# Patient Record
Sex: MALE | Race: OTHER | Employment: FULL TIME | URBAN - METROPOLITAN AREA
[De-identification: names, ages, dates, MRNs, and addresses within clinical notes are randomized per-mention and may not be internally consistent; named-entity substitution may affect disease eponyms.]

---

## 2017-12-05 DIAGNOSIS — E78.00 PURE HYPERCHOLESTEROLEMIA: ICD-10-CM

## 2017-12-06 RX ORDER — SIMVASTATIN 10 MG/1
TABLET, FILM COATED ORAL
Qty: 30 TAB | Refills: 0 | Status: SHIPPED | OUTPATIENT
Start: 2017-12-06 | End: 2018-07-13

## 2017-12-06 NOTE — TELEPHONE ENCOUNTER
Please call patient. Mail order refill refused. 30 days sent to local pharmacy. Has not been seen in 1 yr. Never had labs done last year, so last labs ~2 yrs old.   Needs appt & labs prior to any further refills

## 2017-12-07 NOTE — TELEPHONE ENCOUNTER
Called pt and informed him he is due for an appt and labs. Left pt a message stating he is due for an appt and labs.

## 2018-05-06 DIAGNOSIS — E78.00 PURE HYPERCHOLESTEROLEMIA: ICD-10-CM

## 2018-05-06 RX ORDER — SIMVASTATIN 10 MG/1
TABLET, FILM COATED ORAL
Qty: 30 TAB | Refills: 0 | OUTPATIENT
Start: 2018-05-06

## 2018-05-10 NOTE — TELEPHONE ENCOUNTER
Attempted to reach patient via phone, unsucessful. Left message on personal cell to return call, overdue for f/u and labs. Med not approved, needs to schedule an appointment.

## 2018-05-22 ENCOUNTER — OFFICE VISIT (OUTPATIENT)
Dept: SLEEP MEDICINE | Age: 38
End: 2018-05-22

## 2018-05-22 VITALS
HEART RATE: 94 BPM | BODY MASS INDEX: 30.03 KG/M2 | SYSTOLIC BLOOD PRESSURE: 143 MMHG | DIASTOLIC BLOOD PRESSURE: 83 MMHG | WEIGHT: 214.5 LBS | OXYGEN SATURATION: 97 % | HEIGHT: 71 IN

## 2018-05-22 DIAGNOSIS — Z86.59 H/O ANXIETY DISORDER: ICD-10-CM

## 2018-05-22 DIAGNOSIS — Z88.9 H/O SEASONAL ALLERGIES: ICD-10-CM

## 2018-05-22 DIAGNOSIS — G47.33 OSA (OBSTRUCTIVE SLEEP APNEA): Primary | ICD-10-CM

## 2018-05-22 RX ORDER — CETIRIZINE HCL 10 MG
TABLET ORAL
Refills: 3 | COMMUNITY
Start: 2018-04-09

## 2018-05-22 RX ORDER — FAMOTIDINE 20 MG/1
TABLET, FILM COATED ORAL
COMMUNITY
Start: 2018-02-20 | End: 2019-09-24

## 2018-05-22 NOTE — PATIENT INSTRUCTIONS
[address] [facility] [place]. [place], [address]  Tel.  [phone]  Fax. [address] [facility] [address]  [place], [address] [facility]  Tel.  [phone]  Fax. [phone] [address]  Tel.  [phone]  Fax. [phone]     Sleep Apnea: After Your Visit  Your Care Instructions  Sleep apnea occurs when you frequently stop breathing for 10 seconds or longer during sleep. It can be mild to severe, based on the number of times per hour that you stop breathing or have slowed breathing. Blocked or narrowed airways in your nose, mouth, or throat can cause sleep apnea. Your airway can become blocked when your throat muscles and tongue relax during sleep. Sleep apnea is common, occurring in 1 out of 20 individuals. Individuals having any of the following characteristics should be evaluated and treated right away due to high risk and detrimental consequences from untreated sleep apnea:  1. Obesity  2. Congestive Heart failure  3. Atrial Fibrillation  4. Uncontrolled Hypertension  5. Type II Diabetes  6. Night-time Arrhythmias  7. Stroke  8. Pulmonary Hypertension  9. High-risk Driving Populations (pilots, truck drivers, etc.)  10. Patients Considering Weight-loss Surgery    How do you know you have sleep apnea? You probably have sleep apnea if you answer 'yes' to 3 or more of the following questions:  S - Have you been told that you Snore? T - Are you often Tired during the day? O - Has anyone Observed you stop breathing while sleeping? P- Do you have (or are being treated for) high blood Pressure? B - Are you obese (Body Mass Index > 35)? A - Is your Age 48years old or older? N - Is your Neck size greater than 16 inches? G - Are you male Gender? A sleep physician can prescribe a breathing device that prevents tissues in the throat from blocking your airway.  Or your doctor may recommend using a dental device (oral breathing device) to help keep your airway open. In some cases, surgery may be needed to remove enlarged tissues in the throat. Follow-up care is a key part of your treatment and safety. Be sure to make and go to all appointments, and call your doctor if you are having problems. It's also a good idea to know your test results and keep a list of the medicines you take. How can you care for yourself at home? · Lose weight, if needed. It may reduce the number of times you stop breathing or have slowed breathing. · Go to bed at the same time every night. · Sleep on your side. It may stop mild apnea. If you tend to roll onto your back, sew a pocket in the back of your pajama top. Put a tennis ball into the pocket, and stitch the pocket shut. This will help keep you from sleeping on your back. · Avoid alcohol and medicines such as sleeping pills and sedatives before bed. · Do not smoke. Smoking can make sleep apnea worse. If you need help quitting, talk to your doctor about stop-smoking programs and medicines. These can increase your chances of quitting for good. · Prop up the head of your bed 4 to 6 inches by putting bricks under the legs of the bed. · Treat breathing problems, such as a stuffy nose, caused by a cold or allergies. · Use a continuous positive airway pressure (CPAP) breathing machine if lifestyle changes do not help your apnea and your doctor recommends it. The machine keeps your airway from closing when you sleep. · If CPAP does not help you, ask your doctor whether you should try other breathing machines. A bilevel positive airway pressure machine has two types of air pressureâone for breathing in and one for breathing out. Another device raises or lowers air pressure as needed while you breathe. · If your nose feels dry or bleeds when using one of these machines, talk with your doctor about increasing moisture in the air. A humidifier may help.   · If your nose is runny or stuffy from using a breathing machine, talk with your doctor about using decongestants or a corticosteroid nasal spray. When should you call for help? Watch closely for changes in your health, and be sure to contact your doctor if:  · You still have sleep apnea even though you have made lifestyle changes. · You are thinking of trying a device such as CPAP. · You are having problems using a CPAP or similar machine. Where can you learn more? Go to [employer] Enter H865 in the search box to learn more about \"Sleep Apnea: After Your Visit. \"   © 7535-5257 [employer]wise, [employer]. Care instructions adapted under license by Rachell Mallory (which disclaims liability or warranty for this information). This care instruction is for use with your licensed healthcare professional. If you have questions about a medical condition or this instruction, always ask your healthcare professional. Katherine Estradaen any warranty or liability for your use of this information. PROPER SLEEP HYGIENE    What to avoid  · Do not have drinks with caffeine, such as coffee or black tea, for 8 hours before bed. · Do not smoke or use other types of tobacco near bedtime. Nicotine is a stimulant and can keep you awake. · Avoid drinking alcohol late in the evening, because it can cause you to wake in the middle of the night. · Do not eat a big meal close to bedtime. If you are hungry, eat a light snack. · Do not drink a lot of water close to bedtime, because the need to urinate may wake you up during the night. · Do not read or watch TV in bed. Use the bed only for sleeping and sexual activity. What to try  · Go to bed at the same time every night, and wake up at the same time every morning. Do not take naps during the day. · Keep your bedroom quiet, dark, and cool. · Get regular exercise, but not within 3 to 4 hours of your bedtime. .  · Sleep on a comfortable pillow and mattress.   · If watching the clock makes you anxious, turn it facing away from you so you cannot see the time. · If you worry when you lie down, start a worry book. Well before bedtime, write down your worries, and then set the book and your concerns aside. · Try meditation or other relaxation techniques before you go to bed. · If you cannot fall asleep, get up and go to another room until you feel sleepy. Do something relaxing. Repeat your bedtime routine before you go to bed again. · Make your house quiet and calm about an hour before bedtime. Turn down the lights, turn off the TV, log off the computer, and turn down the volume on music. This can help you relax after a busy day. Drowsy Driving  The [address] [place] [address] Road Traffic Safety Administration cites drowsiness as a causing factor in more than 586,292 police reported crashes annually, resulting in 76,000 injuries and 1,500 deaths. Other surveys suggest 55% of people polled have driven while drowsy in the past year, 23% had fallen asleep but not crashed, 3% crashed, and 2% had and accident due to drowsy driving. Who is at risk? Young Drivers: One study of drowsy driving accidents states that 55% of the drivers were under 25 years. Of those, 75% were male. Shift Workers and Travelers: People who work overnight or travel across time zones frequently are at higher risk of experiencing Circadian Rhythm Disorders. They are trying to work and function when their body is programed to sleep. Sleep Deprived: Lack of sleep has a serious impact on your ability to pay attention or focus on a task. Consistently getting less than the average of 8 hours your body needs creates partial or cumulative sleep deprivation. Untreated Sleep Disorders: Sleep Apnea, Narcolepsy, R.L.S., and other sleep disorders (untreated) prevent a person from getting enough restful sleep. This leads to excessive daytime sleepiness and increases the risk for drowsy driving accidents by up to 7 times.   Medications / Alcohol: Even over the counter medications can cause drowsiness. Medications that impair a drivers attention should have a warning label. Alcohol naturally makes you sleepy and on its own can cause accidents. Combined with excessive drowsiness its effects are amplified. Signs of Drowsy Driving:   * You don't remember driving the last few miles   * You may drift out of your shannan   * You are unable to focus and your thoughts wander   * You may yawn more often than normal   * You have difficulty keeping your eyes open / nodding off   * Missing traffic signs, speeding, or tailgating  Prevention-   Good sleep hygiene, lifestyle and behavioral choices have the most impact on drowsy driving. There is no substitute for sleep and the average person requires 8 hours nightly. If you find yourself driving drowsy, stop and sleep. Consider the sleep hygiene tips provided during your visit as well. Medication Refill Policy: Refills for all medications require 1 week advance notice. Please have your pharmacy fax a refill request. We are unable to fax, or call in \"controled substance\" medications and you will need to pick these prescriptions up from our office. [employer] Activation    Thank you for requesting access to [employer]. Please follow the instructions below to securely access and download your online medical record. [employer] allows you to send messages to your doctor, view your test results, renew your prescriptions, schedule appointments, and more. How Do I Sign Up? 1. In your internet browser, go to [email or web address]. 2. Click on the First Time User? Click Here link in the Sign In box. You will see the New Member Sign Up page. 3. Enter your [employer] Access Code exactly as it appears below. You will not need to use this code after youve completed the sign-up process. If you do not sign up before the expiration date, you must request a new code. [employer] Access Code:  Activation code not generated  Current [employer] Status: Active (This is the date your [employer] access code will )    4. Enter the last four digits of your Social Security Number (xxxx) and Date of Birth (mm/dd/yyyy) as indicated and click Submit. You will be taken to the next sign-up page. 5. Create a [employer] ID. This will be your [employer] login ID and cannot be changed, so think of one that is secure and easy to remember. 6. Create a [employer] password. You can change your password at any time. 7. Enter your Password Reset Question and Answer. This can be used at a later time if you forget your password. 8. Enter your e-mail address. You will receive e-mail notification when new information is available in [address] 9. Click Sign Up. You can now view and download portions of your medical record. 10. Click the Download Summary menu link to download a portable copy of your medical information. Additional Information    If you have questions, please call [phone]. Remember, [employer] is NOT to be used for urgent needs. For medical emergencies, dial 911.

## 2018-05-22 NOTE — MR AVS SNAPSHOT
[address] [address] [address] [address] 
[phone] Patient: Damon Batres MRN: [id number] DTA:6/90/4031 Visit Information Date & Time Provider Department Dept. Phone Encounter #  
 5/22/2018  3:00 PM Edil Aguirre MD [address] [address] [id number] Follow-up Instructions Return if symptoms worsen or fail to improve. Follow-up and Disposition History Upcoming Health Maintenance Date Due Influenza Age 5 to Adult 8/1/2018 DTaP/Tdap/Td series (2 - Td) 11/1/2026 Allergies as of 5/22/2018  Review Complete On: 5/22/2018 By: Edil Aguirre MD  
 No Known Allergies Current Immunizations  Reviewed on 11/1/2016 Name Date Influenza Vaccine (Quad) PF 11/1/2016 Tdap 11/1/2016 Not reviewed this visit You Were Diagnosed With   
  
 Codes Comments YUNG (obstructive sleep apnea)    -  Primary ICD-10-CM: G47.33 
ICD-9-CM: 327.23   
 H/O seasonal allergies     ICD-10-CM: Z88.9 ICD-9-CM: V15.09   
 H/O anxiety disorder     ICD-10-CM: Z86.59 
ICD-9-CM: V11.8 BMI 29.0-29.9,adult     ICD-10-CM: U67.65 ICD-9-CM: V85.25 Vitals BP Pulse Height(growth percentile) Weight(growth percentile) SpO2 BMI  
 143/83 94 5' 11.45\" (1.815 m) 214 lb 8 oz (97.3 kg) 97% 29.54 kg/m2 Smoking Status Never Smoker Vitals History BMI and BSA Data Body Mass Index Body Surface Area  
 29.54 kg/m 2 2.21 m 2 Preferred Pharmacy Pharmacy Name Phone [id number] [place] [phone] [facility] [phone] Your Updated Medication List  
  
   
This list is accurate as of 5/22/18  4:15 PM.  Always use your most recent med list.  
  
  
  
  
 cetirizine 10 mg tablet Commonly known as:  ZYRTEC  
TAKE 1 TABLET BY MOUTH AT BEDTIME CLARITIN 10 mg tablet Generic drug:  loratadine Take 10 mg by mouth daily. famotidine 20 mg tablet Commonly known as:  PEPCID  
  
 NASACORT AQ 55 mcg nasal inhaler Generic drug:  triamcinolone 2 Sprays daily. simvastatin 10 mg tablet Commonly known as:  ZOCOR  
TAKE 1 TABLET BY MOUTH  NIGHTLY We Performed the Following SLEEP STUDY UNATTENDED, 4 CHANNEL [id number] CPT(R)] Follow-up Instructions Return if symptoms worsen or fail to improve. Patient Instructions 217 [facility]., Nick. 1668 [facility], [address] Tel.  [phone] Fax. 100 [facility] [address] [place], 200 [address] [facility] Tel.  [phone] Fax. [phone] 3300 [facility] [address] [place] [address] Tel.  [phone] Fax. [phone] Sleep Apnea: After Your Visit Your Care Instructions Sleep apnea occurs when you frequently stop breathing for 10 seconds or longer during sleep. It can be mild to severe, based on the number of times per hour that you stop breathing or have slowed breathing. Blocked or narrowed airways in your nose, mouth, or throat can cause sleep apnea. Your airway can become blocked when your throat muscles and tongue relax during sleep. Sleep apnea is common, occurring in 1 out of 20 individuals. Individuals having any of the following characteristics should be evaluated and treated right away due to high risk and detrimental consequences from untreated sleep apnea: 
1. Obesity 2. Congestive Heart failure 3. Atrial Fibrillation 4. Uncontrolled Hypertension 5. Type II Diabetes 6. Night-time Arrhythmias 7. Stroke 8. Pulmonary Hypertension 9. High-risk Driving Populations (pilots, truck drivers, etc.) 10. Patients Considering Weight-loss Surgery How do you know you have sleep apnea? You probably have sleep apnea if you answer 'yes' to 3 or more of the following questions: S - Have you been told that you Snore? T - Are you often Tired during the day? O - Has anyone Observed you stop breathing while sleeping? P- Do you have (or are being treated for) high blood Pressure? B - Are you obese (Body Mass Index > 35)? A - Is your Age 48years old or older? N - Is your Neck size greater than 16 inches? G - Are you male Gender? A sleep physician can prescribe a breathing device that prevents tissues in the throat from blocking your airway. Or your doctor may recommend using a dental device (oral breathing device) to help keep your airway open. In some cases, surgery may be needed to remove enlarged tissues in the throat. Follow-up care is a key part of your treatment and safety. Be sure to make and go to all appointments, and call your doctor if you are having problems. It's also a good idea to know your test results and keep a list of the medicines you take. How can you care for yourself at home? · Lose weight, if needed. It may reduce the number of times you stop breathing or have slowed breathing. · Go to bed at the same time every night. · Sleep on your side. It may stop mild apnea. If you tend to roll onto your back, sew a pocket in the back of your paProtÃ©gÃ© Biomedical top. Put a tennis ball into the pocket, and stitch the pocket shut. This will help keep you from sleeping on your back. · Avoid alcohol and medicines such as sleeping pills and sedatives before bed. · Do not smoke. Smoking can make sleep apnea worse. If you need help quitting, talk to your doctor about stop-smoking programs and medicines. These can increase your chances of quitting for good. · Prop up the head of your bed 4 to 6 inches by putting bricks under the legs of the bed. · Treat breathing problems, such as a stuffy nose, caused by a cold or allergies. · Use a continuous positive airway pressure (CPAP) breathing machine if lifestyle changes do not help your apnea and your doctor recommends it. The machine keeps your airway from closing when you sleep. · If CPAP does not help you, ask your doctor whether you should try other breathing machines. A bilevel positive airway pressure machine has two types of air pressureâone for breathing in and one for breathing out. Another device raises or lowers air pressure as needed while you breathe. · If your nose feels dry or bleeds when using one of these machines, talk with your doctor about increasing moisture in the air. A humidifier may help. · If your nose is runny or stuffy from using a breathing machine, talk with your doctor about using decongestants or a corticosteroid nasal spray. When should you call for help? Watch closely for changes in your health, and be sure to contact your doctor if: 
· You still have sleep apnea even though you have made lifestyle changes. · You are thinking of trying a device such as CPAP. · You are having problems using a CPAP or similar machine. Where can you learn more? Go to [employer]. Enter H760 in the search box to learn more about \"Sleep Apnea: After Your Visit. \"  
© 0609-2250 Healthwise, Incorporated. Care instructions adapted under license by [facility] (which disclaims liability or warranty for this information). This care instruction is for use with your licensed healthcare professional. If you have questions about a medical condition or this instruction, always ask your healthcare professional. Aman Whitter any warranty or liability for your use of this information. PROPER SLEEP HYGIENE What to avoid · Do not have drinks with caffeine, such as coffee or black tea, for 8 hours before bed. · Do not smoke or use other types of tobacco near bedtime. Nicotine is a stimulant and can keep you awake. · Avoid drinking alcohol late in the evening, because it can cause you to wake in the middle of the night. · Do not eat a big meal close to bedtime. If you are hungry, eat a light snack. · Do not drink a lot of water close to bedtime, because the need to urinate may wake you up during the night. · Do not read or watch TV in bed. Use the bed only for sleeping and sexual activity. What to try · Go to bed at the same time every night, and wake up at the same time every morning. Do not take naps during the day. · Keep your bedroom quiet, dark, and cool. · Get regular exercise, but not within 3 to 4 hours of your bedtime. [place] · Sleep on a comfortable pillow and mattress. · If watching the clock makes you anxious, turn it facing away from you so you cannot see the time. · If you worry when you lie down, start a worry book. Well before bedtime, write down your worries, and then set the book and your concerns aside. · Try meditation or other relaxation techniques before you go to bed. · If you cannot fall asleep, get up and go to another room until you feel sleepy. Do something relaxing. Repeat your bedtime routine before you go to bed again. · Make your house quiet and calm about an hour before bedtime. Turn down the lights, turn off the TV, log off the computer, and turn down the volume on music. This can help you relax after a busy day. Drowsy Driving The [place] [address] cites drowsiness as a causing factor in more than 198,271 police reported crashes annually, resulting in 76,000 injuries and 1,500 deaths. Other surveys suggest 55% of people polled have driven while drowsy in the past year, 23% had fallen asleep but not crashed, 3% crashed, and 2% had and accident due to drowsy driving. Who is at risk? Young Drivers: One study of drowsy driving accidents states that 55% of the drivers were under 25 years. Of those, 75% were male. Shift Workers and Travelers: People who work overnight or travel across time zones frequently are at higher risk of experiencing Circadian Rhythm Disorders.  They are trying to work and function when their body is programed to sleep. Sleep Deprived: Lack of sleep has a serious impact on your ability to pay attention or focus on a task. Consistently getting less than the average of 8 hours your body needs creates partial or cumulative sleep deprivation. Untreated Sleep Disorders: Sleep Apnea, Narcolepsy, R.L.S., and other sleep disorders (untreated) prevent a person from getting enough restful sleep. This leads to excessive daytime sleepiness and increases the risk for drowsy driving accidents by up to 7 times. Medications / Alcohol: Even over the counter medications can cause drowsiness. Medications that impair a drivers attention should have a warning label. Alcohol naturally makes you sleepy and on its own can cause accidents. Combined with excessive drowsiness its effects are amplified. Signs of Drowsy Driving: * You don't remember driving the last few miles * You may drift out of your shannan * You are unable to focus and your thoughts wander * You may yawn more often than normal 
 * You have difficulty keeping your eyes open / nodding off * Missing traffic signs, speeding, or tailgating Prevention-  
Good sleep hygiene, lifestyle and behavioral choices have the most impact on drowsy driving. There is no substitute for sleep and the average person requires 8 hours nightly. If you find yourself driving drowsy, stop and sleep. Consider the sleep hygiene tips provided during your visit as well. Medication Refill Policy: Refills for all medications require 1 week advance notice. Please have your pharmacy fax a refill request. We are unable to fax, or call in \"controled substance\" medications and you will need to pick these prescriptions up from our office. XL Video Activation Thank you for requesting access to XL Video. Please follow the instructions below to securely access and download your online medical record.  XL Video allows you to send messages to your doctor, view your test results, renew your prescriptions, schedule appointments, and more. How Do I Sign Up? 1. In your internet browser, go to [email or web address] 2. Click on the First Time User? Click Here link in the Sign In box. You will see the New Member Sign Up page. 3. Enter your Food Geniust Access Code exactly as it appears below. You will not need to use this code after youve completed the sign-up process. If you do not sign up before the expiration date, you must request a new code. MyChart Access Code: Activation code not generated Current [email or web address] Application Status: Active (This is the date your Food Geniust access code will ) 4. Enter the last four digits of your Social Security Number (xxxx) and Date of Birth (mm/dd/yyyy) as indicated and click Submit. You will be taken to the next sign-up page. 5. Create a [email or web address] Application ID. This will be your [email or web address] Application login ID and cannot be changed, so think of one that is secure and easy to remember. 6. Create a [email or web address] Application password. You can change your password at any time. 7. Enter your Password Reset Question and Answer. This can be used at a later time if you forget your password. 8. Enter your e-mail address. You will receive e-mail notification when new information is available in [address] 9. Click Sign Up. You can now view and download portions of your medical record. 10. Click the Download Summary menu link to download a portable copy of your medical information. Additional Information If you have questions, please call [phone]. Remember, [email or web address] Application is NOT to be used for urgent needs. For medical emergencies, dial 911. Introducing [facility]! Dear Hazel Zhang: Thank you for requesting a [email or web address] Application account. Our records indicate that you already have an active [email or web address] Application account. You can access your account anytime at [email or web address] Did you know that you can access your hospital and ER discharge instructions at any time in Accelerate Mobile Apps? You can also review all of your test results from your hospital stay or ER visit. Additional Information If you have questions, please visit the Frequently Asked Questions section of the Accelerate Mobile Apps website at [email or web address] Remember, [employer] Mobile Apps is NOT to be used for urgent needs. For medical emergencies, dial 911. Now available from your iPhone and Android! Please provide this summary of care documentation to your next provider. Your primary care clinician is listed as Lila Martines. If you have any questions after today's visit, please call [phone].

## 2018-05-22 NOTE — PROGRESS NOTES
217 [facility]., [place]. [place], [address]  Tel.  [phone]  Fax. 100 [facility] 60  [place], 200 [address] [facility]  Tel.  [phone]  Fax. [phone] [address] [place] [address] [place]   Tel.  [phone]  Fax. [phone]         Subjective:      Radha Carpio is an 41 y.o. male referred for evaluation for a sleep disorder. He complains of snoring associated with snorting and daytime tiredness. Symptoms began 5 years ago, gradually worsening since that time. He usually can fall asleep in 5 - 10 minutes. Family or house members note snoring, snorting. He denies completely or partially paralyzed while falling asleep or waking up. Radha Carpio does wake up frequently at night. He is bothered by waking up too early and left unable to get back to sleep. He actually sleeps about 8 hours at night and wakes up about 3 times during the night. He does not work shifts: Jeremi Marie indicates he does not get too little sleep at night. His bedtime is 0000. He awakens at 0600. He does take naps. He takes 1 naps a week lasting 30 to 45, Minute(s). He has the following observed behaviors: Loud snoring, Light snoring; He denies of an urge to move extremities due to discomfort or other sensation and denies of dream enactment behavior. Other remarks: Waking with a gasp or snort    Berlin Sleepiness Score: 11 which reflect moderate daytime drowsiness. No Known Allergies      Current Outpatient Prescriptions:     cetirizine (ZYRTEC) 10 mg tablet, TAKE 1 TABLET BY MOUTH AT BEDTIME, Disp: , Rfl: 3    famotidine (PEPCID) 20 mg tablet, , Disp: , Rfl:     simvastatin (ZOCOR) 10 mg tablet, TAKE 1 TABLET BY MOUTH  NIGHTLY, Disp: 30 Tab, Rfl: 0    triamcinolone (NASACORT AQ) 55 mcg nasal inhaler, 2 Sprays daily. , Disp: , Rfl:     loratadine (CLARITIN) 10 mg tablet, Take 10 mg by mouth daily. , Disp: , Rfl:      He  has a past medical history of Anxiety; GERD (gastroesophageal reflux disease); Hyperlipidemia; and Lumbar back pain. He  has a past surgical history that includes hx endoscopy (1/29/16). He family history includes Cancer in his mother; Heart Disease (age of onset: 68) in his father; Hypertension in his father; No Known Problems in his brother, sister, sister, son, and son. He  reports that he has never smoked. He has never used smokeless tobacco. He reports that he drinks about 0.6 oz of alcohol per week  He reports that he does not use illicit drugs. Review of Systems:  Constitutional:  significant weight gain -  10 lbs in past 2 years  Eyes:  No blurred vision  CVS:  No significant chest pain  Pulm:  No significant shortness of breath  GI:  No significant nausea or vomiting  :  No significant nocturia  Musculoskeletal:  No significant joint pain at night  Skin:  No significant rashes  Neuro:  No significant dizziness   Psych:  No active mood issues    Sleep Review of Systems: notable for no difficulty falling asleep; infrequent awakenings at night;  regular dreaming noted; no nightmares ; no early morning headaches; no memory problems; no concentration issues; no history of any automobile or occupational accidents due to daytime drowsiness. Objective:     Visit Vitals    /83    Pulse 94    Ht 5' 11.45\" (1.815 m)    Wt 214 lb 8 oz (97.3 kg)    SpO2 97%    BMI 29.54 kg/m2         General:   Not in acute distress   Eyes:  Anicteric sclerae, no obvious strabismus   Nose:  No obvious nasal septum deviation    Oropharynx:   Class 2 oropharyngeal outlet, thick tongue base, uvula could not be seen due to low-lying soft palate, narrow tonsilo-pharyngeal pilars   Tonsils:   tonsils are not seen due to low-lying soft palate   Neck:   Neck circ. in \"inches\": 15; midline trachea   Chest/Lungs:  Equal lung expansion, clear on auscultation    CVS:  Normal rate, regular rhythm; no JVD   Skin:  Warm to touch; no obvious rashes   Neuro:   No focal deficits ; no obvious tremor    Psych:  Normal affect,  normal countenance;          Assessment:       ICD-10-CM ICD-9-CM    1. YUNG (obstructive sleep apnea) G47.33 327.23 SLEEP STUDY UNATTENDED, 4 CHANNEL   2. H/O seasonal allergies Z88.9 V15.09    3. H/O anxiety disorder Z86.59 V11.8    4. BMI 29.0-29.9,adult Z68.29 V85.25          Plan:     * The patient currently has a Low Risk for having sleep apnea. STOP-BANG score 3.  * Sleep testing was ordered for initial evaluation. * He was provided information on sleep apnea including coresponding risk factors and the importance of proper treatment. * Treatment options if indicated were reviewed today. Patient agrees to a trial of PAP therapy / upper airway evaluation (Dr. Karrie Bhatti) if indicated. * Counseling was provided regarding proper sleep hygiene (including effect of light on sleep), paradoxical intention, stimulus control, sleep environment safety and safe driving. * Effect of sleep disturbance on weight was reviewed. We have recommended a dedicated weight loss through appropriate diet and an exercise regiment as significant weight reduction has been shown to reduce severity of obstructive sleep apnea. * Patient agrees to telephone [phone]  follow-up by myself or lead sleep technologist shortly after sleep study to review results and plan final management.     (patient has given permission for a message to be left regarding test results and further management if patient cannot be cannot be reached directly). Thank you for allowing us to participate in your patient's medical care. We'll keep you updated on these investigations. Anneliese Stover MD, FAASM  Electronically signed.  05/22/18

## 2018-05-22 NOTE — PROGRESS NOTES
217 [facility]., [address]. [place], [address]  Tel.  [phone]  Fax. 100 [facility] 60  [place], 200 [address] [facility]  Tel.  [phone]  Fax. [phone] 9250 [facility] [address]   Tel.  [phone]  Fax. [phone]       S>Remy Juarez is a 41 y.o. male seen today to receive a home sleep testing unit (HST). · Patient was educated on proper hookup and operation of the HST. · Instruction forms and documentation were reviewed and signed. · The patient demonstrated good understanding of the HST. O>    Visit Vitals    /83    Pulse 94    Ht 5' 11.45\" (1.815 m)    Wt 214 lb 8 oz (97.3 kg)    SpO2 97%    BMI 29.54 kg/m2    Neck circ. in \"inches\": 15    A>  1. YUNG (obstructive sleep apnea)    2. H/O seasonal allergies    3. H/O anxiety disorder    4. BMI 29.0-29.9,adult          P>  · General information regarding operations and maintenance of the device was provided. · He was provided information on sleep apnea including coresponding risk factors and the importance of proper treatment. · Follow-up appointment was made to return the HST. He will be contacted once the results have been reviewed. · He was asked to contact our office for any problems regarding his home sleep test study.

## 2018-05-23 ENCOUNTER — HOSPITAL ENCOUNTER (OUTPATIENT)
Dept: SLEEP MEDICINE | Age: 38
Discharge: HOME | End: 2018-05-23
Payer: COMMERCIAL

## 2018-05-23 ENCOUNTER — DOCUMENTATION ONLY (OUTPATIENT)
Dept: SLEEP MEDICINE | Age: 38
End: 2018-05-23

## 2018-05-23 PROCEDURE — 95806 SLEEP STUDY UNATT&RESP EFFT: CPT | Performed by: INTERNAL MEDICINE

## 2018-05-25 ENCOUNTER — TELEPHONE (OUTPATIENT)
Dept: SLEEP MEDICINE | Age: 38
End: 2018-05-25

## 2018-05-25 NOTE — TELEPHONE ENCOUNTER
[facility]    Date of Study: 5/23/18    The following information was gathered from the patients study log:    · Lights off: 11p  · Estimated sleep onset: 11:30P    · Awakened a total of several times  · The patient felt they slept 7 hours  · Patient took no sleep aid before starting the test  · Sleep quality was worse compared to a usual nights sleep. Further information provided: \"This was a particularly bad night. I was anxious because of an e-mail from work and ate a dark chocolate for dessert that affected my stomach. \"

## 2018-05-29 NOTE — TELEPHONE ENCOUNTER
Kaykay Crum is to be contacted by lead sleep technologist regarding results of Sleep Testing which was indicative of an average AHI of 0.7 per hour with an SpO2 meme of 93% and SpO2 of < 88% being 0 minutes. Patient should return for repeat testing due to poor sleep on night of testing and presence of significant risk factors for Obstructive Sleep Apnea - STOP- BANG 3.

## 2018-05-30 NOTE — TELEPHONE ENCOUNTER
Reviewed results with patient and he verbalized understanding. He is willing to repeat the study but is going out of town for a month and will call us when he returns.

## 2018-07-13 ENCOUNTER — OFFICE VISIT (OUTPATIENT)
Dept: INTERNAL MEDICINE CLINIC | Age: 38
End: 2018-07-13

## 2018-07-13 VITALS
WEIGHT: 215 LBS | DIASTOLIC BLOOD PRESSURE: 72 MMHG | BODY MASS INDEX: 30.1 KG/M2 | TEMPERATURE: 98.1 F | SYSTOLIC BLOOD PRESSURE: 108 MMHG | HEIGHT: 71 IN | OXYGEN SATURATION: 97 % | RESPIRATION RATE: 14 BRPM | HEART RATE: 80 BPM

## 2018-07-13 DIAGNOSIS — E66.3 OVERWEIGHT (BMI 25.0-29.9): ICD-10-CM

## 2018-07-13 DIAGNOSIS — E78.00 PURE HYPERCHOLESTEROLEMIA: Primary | ICD-10-CM

## 2018-07-13 DIAGNOSIS — R09.82 POST-NASAL DRIP: ICD-10-CM

## 2018-07-13 RX ORDER — SIMVASTATIN 10 MG/1
TABLET, FILM COATED ORAL
Qty: 90 TAB | Refills: 0 | Status: CANCELLED | OUTPATIENT
Start: 2018-07-13

## 2018-07-13 NOTE — PROGRESS NOTES
Jose Cunningham is a 41 y.o. male who was seen in clinic today (7/13/2018). Since last visit moved to [place], [place] and has moved back about 1 year ago. Last seen in clinic on 11/1/16. Assessment & Plan:   Diagnoses and all orders for this visit:    1. Pure hypercholesterolemia- chronic issue, unknown control, non-compliant w/ f/u & medications, reviewed diet & medications, will check labs & then restart meds. Will likely need to repeat labs in 3 months  -     METABOLIC PANEL, COMPREHENSIVE  -     LIPID PANEL    2. Overweight (BMI 25.0-29. 9)- poorly controlled, needs improvement, worsening, I have reviewed/discussed the above normal BMI with the patient. I have recommended the following interventions: encourage exercise and lifestyle education regarding diet. 3. Post-nasal drip- stable, defer to specialist, reviewed sleep study, he would like to put off f/u at this time and sounds reasonable while trying to make life style changes         Follow-up Disposition:  Return in about 1 year (around 7/13/2019), or if symptoms worsen or fail to improve, for FULL PHYSICAL - 30 minutes. Subjective:   Lily Gómez was seen today for Cholesterol Problem    Cardiovascular Review  The patient has hyperlipidemia and overweight. Since last visit: weight has increased and has been off medication for 4-6 wks. He reports taking medications as instructed, no medication side effects noted. Diet and Lifestyle: generally follows a low fat low cholesterol diet, sedentary. Labs: no lab studies available for review at time of visit. Allergies  He has Allergic Rhinitis that be consistant throughout the year. since last visit saw ENT and sleep specialist.  Reviewed sleep study. He reports feeling better. Current symptoms: intermittent symptoms. He reports: taking medications as instructed, no medication side effects noted.               Brief Labs:   No results found for: NA, K, CREA, CREATEXT, CHOL, HDL, LDLC, LDL, LDLCEXT, TRIGL, HBA1C, QEI9XCIO, TSH, RFQ3GDHH, TSHEXT       Prior to Admission medications    Medication Sig Start Date End Date Taking? Authorizing Provider   OTHER Ketoconazole shampoo   Yes Historical Provider   OTHER Allergy shots every week   Yes Historical Provider   cetirizine (ZYRTEC) 10 mg tablet TAKE 1 TABLET BY MOUTH AT BEDTIME 4/9/18  Yes Historical Provider   famotidine (PEPCID) 20 mg tablet  2/20/18  Yes Historical Provider   triamcinolone (NASACORT AQ) 55 mcg nasal inhaler 2 Sprays daily. Yes Historical Provider          No Known Allergies        Review of Systems   Respiratory: Negative for cough and shortness of breath. Cardiovascular: Negative for chest pain and palpitations. Gastrointestinal: Negative for abdominal pain, constipation, diarrhea, nausea and vomiting. Objective:   Physical Exam   Cardiovascular: Regular rhythm and normal heart sounds. No murmur heard. Pulmonary/Chest: Effort normal and breath sounds normal. He has no wheezes. He has no rales. Abdominal: Soft. Bowel sounds are normal. He exhibits no mass. There is no hepatosplenomegaly. There is no tenderness. Visit Vitals    /72    Pulse 80    Temp 98.1 °F (36.7 °C) (Oral)    Resp 14    Ht 5' 11.45\" (1.815 m)    Wt 215 lb (97.5 kg)    SpO2 97%    BMI 29.61 kg/m2         Disclaimer:  Advised him to call back or return to office if symptoms worsen/change/persist.  Discussed expected course/resolution/complications of diagnosis in detail with patient. Medication risks/benefits/costs/interactions/alternatives discussed with patient. He was given an after visit summary which includes diagnoses, current medications, & vitals. He expressed understanding with the diagnosis and plan. Aspects of this note may have been generated using voice recognition software. Despite editing, there may be some syntax errors.        Richrd Paget, MD

## 2018-07-19 LAB
ALBUMIN SERPL-MCNC: 4.5 G/DL (ref 3.5–5.5)
ALBUMIN/GLOB SERPL: 1.6 {RATIO} (ref 1.2–2.2)
ALP SERPL-CCNC: 51 IU/L (ref 39–117)
ALT SERPL-CCNC: 28 IU/L (ref 0–44)
AST SERPL-CCNC: 20 IU/L (ref 0–40)
BILIRUB SERPL-MCNC: 0.4 MG/DL (ref 0–1.2)
BUN SERPL-MCNC: 12 MG/DL (ref 6–20)
BUN/CREAT SERPL: 13 (ref 9–20)
CALCIUM SERPL-MCNC: 9.5 MG/DL (ref 8.7–10.2)
CHLORIDE SERPL-SCNC: 101 MMOL/L (ref 96–106)
CHOLEST SERPL-MCNC: 249 MG/DL (ref 100–199)
CO2 SERPL-SCNC: 24 MMOL/L (ref 20–29)
CREAT SERPL-MCNC: 0.94 MG/DL (ref 0.76–1.27)
GLOBULIN SER CALC-MCNC: 2.8 G/DL (ref 1.5–4.5)
GLUCOSE SERPL-MCNC: 98 MG/DL (ref 65–99)
HDLC SERPL-MCNC: 37 MG/DL
LDLC SERPL CALC-MCNC: 182 MG/DL (ref 0–99)
POTASSIUM SERPL-SCNC: 4.7 MMOL/L (ref 3.5–5.2)
PROT SERPL-MCNC: 7.3 G/DL (ref 6–8.5)
SODIUM SERPL-SCNC: 140 MMOL/L (ref 134–144)
TRIGL SERPL-MCNC: 148 MG/DL (ref 0–149)
VLDLC SERPL CALC-MCNC: 30 MG/DL (ref 5–40)

## 2018-07-19 RX ORDER — SIMVASTATIN 10 MG/1
10 TABLET, FILM COATED ORAL
Qty: 90 TAB | Refills: 0 | Status: SHIPPED | OUTPATIENT
Start: 2018-07-19 | End: 2018-11-17 | Stop reason: SDUPTHER

## 2018-07-19 NOTE — PROGRESS NOTES
Results released to patient via [employer]. All labs are stable or at goal for him, except for LDL which is worse off meds. , based on father's history will recommend restarting statin.

## 2018-11-17 DIAGNOSIS — E78.00 PURE HYPERCHOLESTEROLEMIA: Primary | ICD-10-CM

## 2018-11-18 RX ORDER — SIMVASTATIN 10 MG/1
TABLET, FILM COATED ORAL
Qty: 90 TAB | Refills: 0 | Status: SHIPPED | OUTPATIENT
Start: 2018-11-18 | End: 2019-03-03 | Stop reason: SDUPTHER

## 2018-11-19 ENCOUNTER — PATIENT MESSAGE (OUTPATIENT)
Dept: INTERNAL MEDICINE CLINIC | Age: 38
End: 2018-11-19

## 2018-11-19 NOTE — TELEPHONE ENCOUNTER
Attempted to reach patient via phone, unsucessful. Left message to check his mychart and to return call if he had any questions.

## 2018-12-04 NOTE — TELEPHONE ENCOUNTER
Call to patient regarding unread mychart message. Advised due for labs since starting meds, lab slip at the , needs to be fasting. Patient verbalized understanding.

## 2019-03-03 DIAGNOSIS — E78.00 PURE HYPERCHOLESTEROLEMIA: ICD-10-CM

## 2019-03-03 RX ORDER — SIMVASTATIN 10 MG/1
TABLET, FILM COATED ORAL
Qty: 90 TAB | Refills: 0 | Status: SHIPPED | OUTPATIENT
Start: 2019-03-03 | End: 2019-09-01 | Stop reason: SDUPTHER

## 2019-03-03 NOTE — LETTER
3/4/2019 11:22 AM 
 
Mr. [address] [phone] Mr. Huitron, We tried to call you and unfortunately your voicemail was full, wanted you to know we refilled your medications but you are due to have your labs drawn. Enclosed you will find the order for Labs, you can come here to have labs drawn or go to your nearest [address] [place] [address]. No appointment is required. Please call if you have any questions. Take care. Sincerely, Sidra Sloan LPN

## 2019-03-04 NOTE — TELEPHONE ENCOUNTER
Please call patient. meds refilled but needs to get labs repeated, needs to be fasting, labs already ordered.

## 2019-03-04 NOTE — TELEPHONE ENCOUNTER
Attempted to reach patient by phone, unsuccessful. Voicemail box is full. Detailed letter and lab slip mailed to patient.

## 2019-03-14 LAB
ALBUMIN SERPL-MCNC: 4.7 G/DL (ref 3.5–5.5)
ALP SERPL-CCNC: 47 IU/L (ref 39–117)
ALT SERPL-CCNC: 37 IU/L (ref 0–44)
AST SERPL-CCNC: 22 IU/L (ref 0–40)
BILIRUB DIRECT SERPL-MCNC: 0.09 MG/DL (ref 0–0.4)
BILIRUB SERPL-MCNC: 0.4 MG/DL (ref 0–1.2)
CHOLEST SERPL-MCNC: 226 MG/DL (ref 100–199)
HDLC SERPL-MCNC: 38 MG/DL
LDLC SERPL CALC-MCNC: 158 MG/DL (ref 0–99)
PROT SERPL-MCNC: 7.5 G/DL (ref 6–8.5)
TRIGL SERPL-MCNC: 148 MG/DL (ref 0–149)
VLDLC SERPL CALC-MCNC: 30 MG/DL (ref 5–40)

## 2019-03-14 NOTE — TELEPHONE ENCOUNTER
Results released to patient via [employer]. Lipids improved back on medications but not as good as previously.   No changes and will address at f/u as he is on moderate intensity statin

## 2019-09-01 DIAGNOSIS — E78.00 PURE HYPERCHOLESTEROLEMIA: ICD-10-CM

## 2019-09-01 RX ORDER — SIMVASTATIN 10 MG/1
TABLET, FILM COATED ORAL
Qty: 30 TAB | Refills: 0 | Status: SHIPPED | OUTPATIENT
Start: 2019-09-01 | End: 2019-09-25

## 2019-09-24 ENCOUNTER — OFFICE VISIT (OUTPATIENT)
Dept: INTERNAL MEDICINE CLINIC | Age: 39
End: 2019-09-24

## 2019-09-24 VITALS
BODY MASS INDEX: 30.52 KG/M2 | RESPIRATION RATE: 16 BRPM | HEIGHT: 71 IN | TEMPERATURE: 98.5 F | WEIGHT: 218 LBS | SYSTOLIC BLOOD PRESSURE: 110 MMHG | DIASTOLIC BLOOD PRESSURE: 76 MMHG | HEART RATE: 83 BPM | OXYGEN SATURATION: 96 %

## 2019-09-24 DIAGNOSIS — Z23 ENCOUNTER FOR IMMUNIZATION: ICD-10-CM

## 2019-09-24 DIAGNOSIS — E78.00 PURE HYPERCHOLESTEROLEMIA: Primary | ICD-10-CM

## 2019-09-24 DIAGNOSIS — E66.9 OBESITY (BMI 30.0-34.9): ICD-10-CM

## 2019-09-24 NOTE — PROGRESS NOTES
Sarthak Plasencia is a 46 y.o. male who was seen in clinic today (9/24/2019). Assessment & Plan:     Diagnoses and all orders for this visit:    1. Pure hypercholesterolemia- improved on meds, reviewed ideal LDL goals, will repeat, continue on low intensity statin for now, did review increasing to 20mg if LDL remains high  -     METABOLIC PANEL, COMPREHENSIVE  -     CBC W/O DIFF  -     LIPID PANEL    2. Obesity (BMI 30.0-34.9)- poorly controlled, worsening, I have recommended the following interventions: encourage exercise and lifestyle education regarding diet. 3. Encounter for immunization  -     DE IMMUNIZ ADMIN,1 SINGLE/COMB VAC/TOXOID  -     INFLUENZA VIRUS VAC QUAD,SPLIT,PRESV FREE SYRINGE IM         Follow-up and Dispositions    · Return in about 1 year (around 9/24/2020) for Regular follow up. Subjective:   Mena Dance was seen today for Cholesterol Problem    Cardiovascular Review  The patient has hyperlipidemia and obesity. Since last visit: started on simvastatin. Weight has increased. He reports taking medications as instructed, no medication side effects noted. Diet and Lifestyle: generally follows a low fat low cholesterol diet, exercises regularly. Labs: reviewed and discussed with patient. Brief Labs:     Lab Results   Component Value Date/Time    Sodium 140 07/19/2018 12:00 AM    Potassium 4.7 07/19/2018 12:00 AM    Creatinine 0.94 07/19/2018 12:00 AM    Cholesterol, total 226 03/13/2019 09:07 AM    HDL Cholesterol 38 03/13/2019 09:07 AM    LDL, calculated 158 03/13/2019 09:07 AM    Triglyceride 148 03/13/2019 09:07 AM          Prior to Admission medications    Medication Sig Start Date End Date Taking?  Authorizing Provider   simvastatin (ZOCOR) 10 mg tablet TAKE 1 TABLET BY MOUTH EVERY DAY AT NIGHT 9/1/19  Yes Gloria Hunt MD   OTHER Ketoconazole shampoo   Yes Provider, Historical   OTHER Allergy shots every week   Yes Provider, Historical   cetirizine (ZYRTEC) 10 mg tablet TAKE 1 TABLET BY MOUTH AT BEDTIME 4/9/18  Yes Provider, Historical   triamcinolone (NASACORT AQ) 55 mcg nasal inhaler 2 Sprays daily. Yes Provider, Historical          No Known Allergies        Review of Systems   Respiratory: Negative for cough and shortness of breath. Cardiovascular: Negative for chest pain and palpitations. Gastrointestinal: Negative for abdominal pain, constipation, diarrhea, nausea and vomiting. Objective:   Physical Exam   Cardiovascular: Regular rhythm and normal heart sounds. No murmur heard. Pulmonary/Chest: Effort normal and breath sounds normal. He has no wheezes. He has no rales. Abdominal: Soft. Bowel sounds are normal. He exhibits no mass. There is no hepatosplenomegaly. There is no tenderness. Visit Vitals  /76   Pulse 83   Temp 98.5 °F (36.9 °C) (Oral)   Resp 16   Ht 5' 11.45\" (1.815 m)   Wt 218 lb (98.9 kg)   SpO2 96%   BMI 30.02 kg/m²         Disclaimer:  Advised him to call back or return to office if symptoms worsen/change/persist.  Discussed expected course/resolution/complications of diagnosis in detail with patient. Medication risks/benefits/costs/interactions/alternatives discussed with patient. He was given an after visit summary which includes diagnoses, current medications, & vitals. He expressed understanding with the diagnosis and plan. Aspects of this note may have been generated using voice recognition software. Despite editing, there may be some syntax errors.        Liberty Sanford MD

## 2019-09-25 DIAGNOSIS — E78.00 PURE HYPERCHOLESTEROLEMIA: ICD-10-CM

## 2019-09-25 LAB
ALBUMIN SERPL-MCNC: 4.9 G/DL (ref 3.5–5.5)
ALBUMIN/GLOB SERPL: 2 {RATIO} (ref 1.2–2.2)
ALP SERPL-CCNC: 50 IU/L (ref 39–117)
ALT SERPL-CCNC: 42 IU/L (ref 0–44)
AST SERPL-CCNC: 27 IU/L (ref 0–40)
BILIRUB SERPL-MCNC: 0.3 MG/DL (ref 0–1.2)
BUN SERPL-MCNC: 14 MG/DL (ref 6–20)
BUN/CREAT SERPL: 14 (ref 9–20)
CALCIUM SERPL-MCNC: 9.6 MG/DL (ref 8.7–10.2)
CHLORIDE SERPL-SCNC: 100 MMOL/L (ref 96–106)
CHOLEST SERPL-MCNC: 224 MG/DL (ref 100–199)
CO2 SERPL-SCNC: 26 MMOL/L (ref 20–29)
CREAT SERPL-MCNC: 1.02 MG/DL (ref 0.76–1.27)
ERYTHROCYTE [DISTWIDTH] IN BLOOD BY AUTOMATED COUNT: 13.7 % (ref 12.3–15.4)
GLOBULIN SER CALC-MCNC: 2.4 G/DL (ref 1.5–4.5)
GLUCOSE SERPL-MCNC: 82 MG/DL (ref 65–99)
HCT VFR BLD AUTO: 44.7 % (ref 37.5–51)
HDLC SERPL-MCNC: 39 MG/DL
HGB BLD-MCNC: 14.7 G/DL (ref 13–17.7)
LDLC SERPL CALC-MCNC: 139 MG/DL (ref 0–99)
MCH RBC QN AUTO: 27.5 PG (ref 26.6–33)
MCHC RBC AUTO-ENTMCNC: 32.9 G/DL (ref 31.5–35.7)
MCV RBC AUTO: 84 FL (ref 79–97)
PLATELET # BLD AUTO: 221 X10E3/UL (ref 150–450)
POTASSIUM SERPL-SCNC: 4.5 MMOL/L (ref 3.5–5.2)
PROT SERPL-MCNC: 7.3 G/DL (ref 6–8.5)
RBC # BLD AUTO: 5.34 X10E6/UL (ref 4.14–5.8)
SODIUM SERPL-SCNC: 142 MMOL/L (ref 134–144)
TRIGL SERPL-MCNC: 232 MG/DL (ref 0–149)
VLDLC SERPL CALC-MCNC: 46 MG/DL (ref 5–40)
WBC # BLD AUTO: 5.8 X10E3/UL (ref 3.4–10.8)

## 2019-09-25 RX ORDER — SIMVASTATIN 10 MG/1
TABLET, FILM COATED ORAL
Qty: 90 TAB | Refills: 3 | Status: SHIPPED | OUTPATIENT
Start: 2019-09-25 | End: 2020-10-18

## 2019-09-25 NOTE — PROGRESS NOTES
Results released to patient via [employer]. All labs are stable or at goal for him. TG up slightly (non-fasting). LDL improved, HDL improved slightly.

## 2020-06-30 ENCOUNTER — TELEPHONE (OUTPATIENT)
Dept: INTERNAL MEDICINE CLINIC | Age: 40
End: 2020-06-30

## 2020-07-02 ENCOUNTER — OFFICE VISIT (OUTPATIENT)
Dept: INTERNAL MEDICINE CLINIC | Age: 40
End: 2020-07-02

## 2020-07-02 VITALS
BODY MASS INDEX: 31.64 KG/M2 | SYSTOLIC BLOOD PRESSURE: 142 MMHG | TEMPERATURE: 97.8 F | HEIGHT: 71 IN | HEART RATE: 98 BPM | DIASTOLIC BLOOD PRESSURE: 92 MMHG | OXYGEN SATURATION: 99 % | RESPIRATION RATE: 18 BRPM | WEIGHT: 226 LBS

## 2020-07-02 DIAGNOSIS — T14.8XXA PUNCTURE WOUND: Primary | ICD-10-CM

## 2020-07-02 RX ORDER — CEPHALEXIN 500 MG/1
500 CAPSULE ORAL
COMMUNITY
Start: 2020-06-30 | End: 2020-07-10 | Stop reason: ALTCHOICE

## 2020-07-02 RX ORDER — DOXYCYCLINE 100 MG/1
100 CAPSULE ORAL
COMMUNITY
Start: 2020-06-30 | End: 2020-07-10 | Stop reason: ALTCHOICE

## 2020-07-02 NOTE — PROGRESS NOTES
HPI:  Ebenezer Ugalde is a 45y.o. year old male who returns to clinic today for an acute visit:   Left first toe 2 puncture wounds from staple treated at retreat with removal of staple and tetanus updated. Started on keflex and doxy for 10 days and taking medication and tolerating with no side effects. Current Outpatient Medications   Medication Sig Dispense Refill    cephALEXin (KEFLEX) 500 mg capsule 500 mg.      doxycycline (VIBRAMYCIN) 100 mg capsule 100 mg.  simvastatin (ZOCOR) 10 mg tablet TAKE 1 TABLET BY MOUTH EVERY DAY AT NIGHT 90 Tab 3    OTHER Ketoconazole shampoo      cetirizine (ZYRTEC) 10 mg tablet TAKE 1 TABLET BY MOUTH AT BEDTIME  3    triamcinolone (NASACORT AQ) 55 mcg nasal inhaler 2 Sprays daily.  OTHER Allergy shots every week       No Known Allergies  Social History     Tobacco Use    Smoking status: Never Smoker    Smokeless tobacco: Never Used   Substance Use Topics    Alcohol use: Yes     Alcohol/week: 1.0 standard drinks     Types: 1 Glasses of wine per week     Frequency: 2-4 times a month     Drinks per session: 1 or 2     Binge frequency: Never         Review of Systems   Constitutional: Negative for fever. Neurological: Positive for sensory change (notes some tingling on and off at site of injection for anethesia on left dorsum toe). Negative for focal weakness. Physical Exam  Vitals signs reviewed. Constitutional:       Appearance: Normal appearance. Cardiovascular:      Pulses:           Dorsalis pedis pulses are 2+ on the right side and 2+ on the left side. Posterior tibial pulses are 2+ on the right side and 2+ on the left side. Neurological:      Mental Status: He is alert. Comments: Sensory and motor left foot toe intact             Puncture wound x 2 with no erythema, mildly tender and no discharge. Assessment & Plan:    ICD-10-CM ICD-9-CM    1. Puncture wound  Left 1st toe resolving. Continue with abx and counseled regarding wound care. T14. 8XXA 879.8         Advised him to call back or return to office if symptoms worsen/change/persist.  Discussed expected course/resolution/complications of diagnosis in detail with patient. Medication risks/benefits/costs/interactions/alternatives discussed with patient. He was given an after visit summary which includes diagnoses, current medications, & vitals. He expressed understanding with the diagnosis and plan.

## 2020-07-10 ENCOUNTER — TELEPHONE (OUTPATIENT)
Dept: INTERNAL MEDICINE CLINIC | Age: 40
End: 2020-07-10

## 2020-07-10 ENCOUNTER — OFFICE VISIT (OUTPATIENT)
Dept: INTERNAL MEDICINE CLINIC | Age: 40
End: 2020-07-10

## 2020-07-10 VITALS
HEIGHT: 71 IN | HEART RATE: 82 BPM | OXYGEN SATURATION: 99 % | SYSTOLIC BLOOD PRESSURE: 131 MMHG | RESPIRATION RATE: 16 BRPM | WEIGHT: 229 LBS | TEMPERATURE: 98.7 F | DIASTOLIC BLOOD PRESSURE: 83 MMHG | BODY MASS INDEX: 32.06 KG/M2

## 2020-07-10 DIAGNOSIS — R00.2 PALPITATIONS: Primary | ICD-10-CM

## 2020-07-10 NOTE — TELEPHONE ENCOUNTER
Attempted to reach patient via phone, unsucessful. Left message to return call. Had called to schedule appointment for heart palpitations.

## 2020-07-11 NOTE — PROGRESS NOTES
HPI:  Mariela Johnson is a 45y.o. year old male who is here for palpitations. that started on 7/3 after his last visit here. Seem to occur in the middle of the night mostly and last for a few minutes and resolve. Some diaphoresis but no exertional nature to the symptoms. No SOB, nausea , or vomiting. No chest pain. No numbness or tingling or weakness. Is concerned because his mother in law recently had sudden death. Past Medical History:   Diagnosis Date    Anxiety     situational, resolved    GERD (gastroesophageal reflux disease)     Hyperlipidemia     Lumbar back pain     h/o herniated L disk per pt. History of PT & steroid injections       Past Surgical History:   Procedure Laterality Date    HX ENDOSCOPY  1/29/16    gastritis       Prior to Admission medications    Medication Sig Start Date End Date Taking? Authorizing Provider   simvastatin (ZOCOR) 10 mg tablet TAKE 1 TABLET BY MOUTH EVERY DAY AT NIGHT 9/25/19  Yes Lou Flynn MD   OTHER Ketoconazole shampoo   Yes Provider, Historical   cetirizine (ZYRTEC) 10 mg tablet TAKE 1 TABLET BY MOUTH AT BEDTIME 4/9/18  Yes Provider, Historical   triamcinolone (NASACORT AQ) 55 mcg nasal inhaler 2 Sprays daily.    Yes Provider, Historical   cephALEXin (KEFLEX) 500 mg capsule 500 mg. 6/30/20 7/10/20  Provider, Historical   doxycycline (VIBRAMYCIN) 100 mg capsule 100 mg. 6/30/20 7/10/20  Provider, Historical   OTHER Allergy shots every week    Provider, Historical       Social History     Socioeconomic History    Marital status:      Spouse name: Not on file    Number of children: Not on file    Years of education: Not on file    Highest education level: Not on file   Occupational History    Not on file   Social Needs    Financial resource strain: Not on file    Food insecurity     Worry: Not on file     Inability: Not on file    Transportation needs     Medical: Not on file     Non-medical: Not on file   Tobacco Use    Smoking status: Never Smoker    Smokeless tobacco: Never Used   Substance and Sexual Activity    Alcohol use:  Yes     Alcohol/week: 1.0 standard drinks     Types: 1 Glasses of wine per week     Frequency: 2-4 times a month     Drinks per session: 1 or 2     Binge frequency: Never    Drug use: No    Sexual activity: Yes     Partners: Female     Birth control/protection: None   Lifestyle    Physical activity     Days per week: Not on file     Minutes per session: Not on file    Stress: Not on file   Relationships    Social connections     Talks on phone: Not on file     Gets together: Not on file     Attends Yazidi service: Not on file     Active member of club or organization: Not on file     Attends meetings of clubs or organizations: Not on file     Relationship status: Not on file    Intimate partner violence     Fear of current or ex partner: Not on file     Emotionally abused: Not on file     Physically abused: Not on file     Forced sexual activity: Not on file   Other Topics Concern    Not on file   Social History Narrative    Not on file          ROS  Per HPI    Visit Vitals  /83   Pulse 82   Temp 98.7 °F (37.1 °C) (Temporal)   Resp 16   Ht 5' 11.45\" (1.815 m)   Wt 229 lb (103.9 kg)   SpO2 99%   BMI 31.54 kg/m²         Physical Exam   Physical Examination: General appearance - alert, well appearing, and in no distress  Mouth - mucous membranes moist, pharynx normal without lesions  Neck - supple, no significant adenopathy  Lymphatics - no palpable lymphadenopathy, no hepatosplenomegaly  Chest - clear to auscultation, no wheezes, rales or rhonchi, symmetric air entry  Heart - normal rate, regular rhythm, normal S1, S2, no murmurs, rubs, clicks or gallops  Abdomen - soft, nontender, nondistended, no masses or organomegaly  Neurological - alert, oriented, normal speech, no focal findings or movement disorder noted  Extremities - peripheral pulses normal, no pedal edema, no clubbing or cyanosis    EKG: normal EKG, normal sinus rhythm, no old tracings for comparison. Ju Orourke MD      Assessment/Plan:  Diagnoses and all orders for this visit:    1. Palpitations - ? PVC's. Will check event monitor to rule out a fib. Will eliminate caffeine, alcohol, stress and stimulants. -     AMB POC EKG ROUTINE W/ 12 LEADS, INTER & REP  -     CARDIAC EVENT MONITOR; Future              Advised him to call back or return to office if symptoms worsen/change/persist.  Discussed expected course/resolution/complications of diagnosis in detail with patient. Medication risks/benefits/costs/interactions/alternatives discussed with patient. He was given an after visit summary which includes diagnoses, current medications, & vitals. He expressed understanding with the diagnosis and plan.

## 2020-07-13 ENCOUNTER — CLINICAL SUPPORT (OUTPATIENT)
Dept: CARDIOLOGY CLINIC | Age: 40
End: 2020-07-13

## 2020-07-13 DIAGNOSIS — R00.2 PALPITATIONS: ICD-10-CM

## 2020-07-20 ENCOUNTER — TELEPHONE (OUTPATIENT)
Dept: INTERNAL MEDICINE CLINIC | Age: 40
End: 2020-07-20

## 2020-07-20 NOTE — TELEPHONE ENCOUNTER
Call to patient, identified with 2 identifiers. Advised of Dr. Sharon Kaplan' note and recommendations. Since having symptoms frequently, should wear for a full week and then send back. Patient verbalized understanding.

## 2020-07-20 NOTE — TELEPHONE ENCOUNTER
Ordered by Dr Melvi Ward, event monitor. Can be up to 1 month. Depends on his symptoms. If he is having it frequently then may only need it for a few days.

## 2020-07-22 ENCOUNTER — TELEPHONE (OUTPATIENT)
Dept: INTERNAL MEDICINE CLINIC | Age: 40
End: 2020-07-22

## 2020-07-22 DIAGNOSIS — R00.2 PALPITATIONS: Primary | ICD-10-CM

## 2020-07-22 RX ORDER — METOPROLOL SUCCINATE 25 MG/1
25 TABLET, EXTENDED RELEASE ORAL DAILY
Qty: 30 TAB | Refills: 0 | Status: SHIPPED | OUTPATIENT
Start: 2020-07-22 | End: 2020-08-16

## 2020-07-23 NOTE — TELEPHONE ENCOUNTER
Verified patient identity with two identifiers. Spoke with patient by phone. Informed per [facility]: Start Metoprolol XR 25mg. 1 tab PO daily. Start this pending review of holter. Patient verbalized understanding.

## 2020-07-27 NOTE — TELEPHONE ENCOUNTER
Attempted to reach patient via phone, unsucessful. Left detailed message to check for instructions or to call cardiology and return call if had any questions.

## 2020-07-28 ENCOUNTER — TELEPHONE (OUTPATIENT)
Dept: INTERNAL MEDICINE CLINIC | Age: 40
End: 2020-07-28

## 2020-07-28 DIAGNOSIS — R00.2 PALPITATIONS: Primary | ICD-10-CM

## 2020-07-28 NOTE — TELEPHONE ENCOUNTER
Patient states he is continuing to have palpitations at night. He states the metoprolol does not seem to help. He would like to know if there is anything else he can do to help with the symptoms.

## 2020-07-28 NOTE — TELEPHONE ENCOUNTER
I don't want to increase metoprolol until we know exactly what we are treating. Holter will take 5-7 days for cardiology to read. I have ordered some labs to make sure everything looks good. At this time he can try a benadryl or melatonin to help relax at night but I'm not sure what is going so can't make any other recommendation until labs and holter reviewed.

## 2020-08-15 DIAGNOSIS — R00.2 PALPITATIONS: ICD-10-CM

## 2020-08-16 RX ORDER — METOPROLOL SUCCINATE 25 MG/1
TABLET, EXTENDED RELEASE ORAL
Qty: 30 TAB | Refills: 0 | Status: SHIPPED | OUTPATIENT
Start: 2020-08-16 | End: 2020-12-29 | Stop reason: ALTCHOICE

## 2020-10-17 DIAGNOSIS — E78.00 PURE HYPERCHOLESTEROLEMIA: ICD-10-CM

## 2020-10-18 RX ORDER — SIMVASTATIN 10 MG/1
TABLET, FILM COATED ORAL
Qty: 30 TAB | Refills: 0 | Status: SHIPPED | OUTPATIENT
Start: 2020-10-18 | End: 2020-11-20

## 2020-11-19 DIAGNOSIS — E78.00 PURE HYPERCHOLESTEROLEMIA: ICD-10-CM

## 2020-11-20 RX ORDER — SIMVASTATIN 10 MG/1
TABLET, FILM COATED ORAL
Qty: 10 TAB | Refills: 0 | Status: SHIPPED | OUTPATIENT
Start: 2020-11-20 | End: 2020-12-23 | Stop reason: SDUPTHER

## 2020-12-23 DIAGNOSIS — E78.00 PURE HYPERCHOLESTEROLEMIA: ICD-10-CM

## 2020-12-23 RX ORDER — SIMVASTATIN 10 MG/1
TABLET, FILM COATED ORAL
Qty: 10 TAB | Refills: 0 | Status: SHIPPED | OUTPATIENT
Start: 2020-12-23 | End: 2020-12-29 | Stop reason: SDUPTHER

## 2020-12-23 NOTE — TELEPHONE ENCOUNTER
Future Appointments   Date Time Provider Polly Humphrey   12/29/2020  7:30 AM Pepe Nguyen MD [facility]

## 2020-12-29 ENCOUNTER — OFFICE VISIT (OUTPATIENT)
Dept: INTERNAL MEDICINE CLINIC | Age: 40
End: 2020-12-29
Payer: COMMERCIAL

## 2020-12-29 VITALS
WEIGHT: 210 LBS | DIASTOLIC BLOOD PRESSURE: 77 MMHG | SYSTOLIC BLOOD PRESSURE: 114 MMHG | HEART RATE: 72 BPM | BODY MASS INDEX: 29.4 KG/M2 | OXYGEN SATURATION: 100 % | HEIGHT: 71 IN | RESPIRATION RATE: 14 BRPM | TEMPERATURE: 97.1 F

## 2020-12-29 DIAGNOSIS — Z00.00 ROUTINE PHYSICAL EXAMINATION: Primary | ICD-10-CM

## 2020-12-29 DIAGNOSIS — E78.00 PURE HYPERCHOLESTEROLEMIA: ICD-10-CM

## 2020-12-29 DIAGNOSIS — R00.2 PALPITATIONS: ICD-10-CM

## 2020-12-29 LAB
ALBUMIN SERPL-MCNC: 4.2 G/DL (ref 3.5–5)
ALBUMIN/GLOB SERPL: 1.2 {RATIO} (ref 1.1–2.2)
ALP SERPL-CCNC: 57 U/L (ref 45–117)
ALT SERPL-CCNC: 33 U/L (ref 12–78)
ANION GAP SERPL CALC-SCNC: 4 MMOL/L (ref 5–15)
AST SERPL-CCNC: 17 U/L (ref 15–37)
BILIRUB SERPL-MCNC: 0.3 MG/DL (ref 0.2–1)
BUN SERPL-MCNC: 18 MG/DL (ref 6–20)
BUN/CREAT SERPL: 21 (ref 12–20)
CALCIUM SERPL-MCNC: 9 MG/DL (ref 8.5–10.1)
CHLORIDE SERPL-SCNC: 104 MMOL/L (ref 97–108)
CHOLEST SERPL-MCNC: 264 MG/DL
CO2 SERPL-SCNC: 30 MMOL/L (ref 21–32)
CREAT SERPL-MCNC: 0.86 MG/DL (ref 0.7–1.3)
ERYTHROCYTE [DISTWIDTH] IN BLOOD BY AUTOMATED COUNT: 14.4 % (ref 11.5–14.5)
GLOBULIN SER CALC-MCNC: 3.6 G/DL (ref 2–4)
GLUCOSE SERPL-MCNC: 93 MG/DL (ref 65–100)
HCT VFR BLD AUTO: 45.8 % (ref 36.6–50.3)
HDLC SERPL-MCNC: 43 MG/DL
HDLC SERPL: 6.1 {RATIO} (ref 0–5)
HGB BLD-MCNC: 14.3 G/DL (ref 12.1–17)
LDLC SERPL CALC-MCNC: 184.2 MG/DL (ref 0–100)
LIPID PROFILE,FLP: ABNORMAL
MCH RBC QN AUTO: 27.6 PG (ref 26–34)
MCHC RBC AUTO-ENTMCNC: 31.2 G/DL (ref 30–36.5)
MCV RBC AUTO: 88.4 FL (ref 80–99)
NRBC # BLD: 0 K/UL (ref 0–0.01)
NRBC BLD-RTO: 0 PER 100 WBC
PLATELET # BLD AUTO: 213 K/UL (ref 150–400)
PMV BLD AUTO: 11 FL (ref 8.9–12.9)
POTASSIUM SERPL-SCNC: 4.6 MMOL/L (ref 3.5–5.1)
PROT SERPL-MCNC: 7.8 G/DL (ref 6.4–8.2)
RBC # BLD AUTO: 5.18 M/UL (ref 4.1–5.7)
SODIUM SERPL-SCNC: 138 MMOL/L (ref 136–145)
TRIGL SERPL-MCNC: 184 MG/DL (ref ?–150)
VLDLC SERPL CALC-MCNC: 36.8 MG/DL
WBC # BLD AUTO: 5.9 K/UL (ref 4.1–11.1)

## 2020-12-29 PROCEDURE — 99395 PREV VISIT EST AGE 18-39: CPT | Performed by: INTERNAL MEDICINE

## 2020-12-29 RX ORDER — OMEPRAZOLE 40 MG/1
40 CAPSULE, DELAYED RELEASE ORAL DAILY
Qty: 30 CAP | Refills: 1 | Status: SHIPPED | OUTPATIENT
Start: 2020-12-29 | End: 2022-01-14 | Stop reason: ALTCHOICE

## 2020-12-29 RX ORDER — SIMVASTATIN 10 MG/1
TABLET, FILM COATED ORAL
Qty: 90 TAB | Refills: 3 | Status: SHIPPED | OUTPATIENT
Start: 2020-12-29 | End: 2022-01-23

## 2020-12-29 RX ORDER — MELATONIN 10 MG
10 CAPSULE ORAL
COMMUNITY

## 2020-12-29 NOTE — PROGRESS NOTES
Carmen Osuna is a 45 y.o. male who was seen in clinic today (12/29/2020) for a physical.            Assessment & Plan:     Diagnoses and all orders for this visit:    1. Routine physical examination  -     METABOLIC PANEL, COMPREHENSIVE; Future  -     CBC W/O DIFF; Future  -     LIPID PANEL; Future    2. Pure hypercholesterolemia- previously well controlled, continue current treatment pending review of labs   -     simvastatin (ZOCOR) 10 mg tablet; TAKE 1 TABLET BY MOUTH AT BEDTIME  -     METABOLIC PANEL, COMPREHENSIVE; Future    3. Palpitations- new dx to me, differential dx reviewed, intermittent, reviewed stress vs silent reflux. Will start on meds x 1 month and reassess.  -     omeprazole (PRILOSEC) 40 mg capsule; Take 1 Cap by mouth daily. Follow-up and Dispositions    · Return in about 1 year (around 12/29/2021) for FULL PHYSICAL - 30 minutes. Subjective:   Aby Sims is here today for a full physical.      Since last visit: palpitations have improved, he will have rare issues in the morning. Health Maintenance  Immunizations:   Influenza: up to date  Tetanus: up to date     Cancer screening:    Reviewed testicular, prostate, and colon cancer screening guidelines. The following acute and/or chronic problems were addressed today:    Cardiovascular Review  The patient has hyperlipidemia. He reports taking medications as instructed, no medication side effects noted. Diet and Lifestyle: generally follows a low fat low cholesterol diet, exercises regularly. Labs: reviewed and discussed with patient. Over the summer (July) he was seen by Dr Konrad Enamorado for palpitations. holter monitor was normal.  Was on metoprolol shortly but since stopped. Since then he reports rare issues, mostly at night.       Patient Care Team:  Zaida Luis MD as PCP - General (Internal Medicine)  Zaida Luis MD as PCP - [facility] Empaneled Provider  Amos Trent MD (Gastroenterology)  Brenda Page Kristopher Lizama MD as Physician (Otolaryngology)       The following sections were reviewed & updated as appropriate: Allergies, PMH, PSH, FH, and SH. Patient Active Problem List   Diagnosis Code    Back pain M54.9    GERD (gastroesophageal reflux disease) K21.9    Hyperlipidemia E78.5    Post-nasal drip R09.82          Prior to Admission medications    Medication Sig Start Date End Date Taking? Authorizing Provider   melatonin 10 mg capsule Take  by mouth nightly. Yes Provider, Historical   simvastatin (ZOCOR) 10 mg tablet TAKE 1 TABLET BY MOUTH AT BEDTIME ** APPT REQUIRED** 12/23/20  Yes Margie Figueroa MD   OTHER Ketoconazole shampoo   Yes Provider, Historical   cetirizine (ZYRTEC) 10 mg tablet TAKE 1 TABLET BY MOUTH AT BEDTIME 4/9/18  Yes Provider, Historical   triamcinolone (NASACORT AQ) 55 mcg nasal inhaler 2 Sprays daily. Yes Provider, Historical   metoprolol succinate (TOPROL-XL) 25 mg XL tablet TAKE 1 TABLET BY MOUTH EVERY DAY 8/16/20   Margie Figueroa MD   OTHER Allergy shots every week    Provider, Historical          No Known Allergies         Review of Systems   Constitutional: Positive for weight loss (due to life style changes- diet and exercise). Negative for chills and fever. Respiratory: Negative for cough and shortness of breath. Cardiovascular: Negative for chest pain and palpitations. Gastrointestinal: Positive for heartburn (using tums prn). Negative for abdominal pain, blood in stool, constipation, diarrhea, nausea and vomiting. Musculoskeletal: Negative for joint pain and myalgias. Neurological: Negative for tingling, focal weakness and headaches. Endo/Heme/Allergies: Does not bruise/bleed easily. Psychiatric/Behavioral: Negative for depression. The patient is not nervous/anxious and does not have insomnia. Subjective:   Physical Exam  Constitutional:       General: He is not in acute distress. Appearance: He is well-developed.    HENT:      Right Ear: Tympanic membrane and ear canal normal.      Left Ear: Tympanic membrane and ear canal normal.      Ears:      Comments: Right ear is: 50% occluded with wet cerumen. Left ear is: 25% occluded with wet cerumen. Eyes:      Conjunctiva/sclera: Conjunctivae normal.   Neck:      Thyroid: No thyromegaly. Cardiovascular:      Rate and Rhythm: Regular rhythm. Heart sounds: Normal heart sounds. No murmur. Pulmonary:      Effort: Pulmonary effort is normal.      Breath sounds: Normal breath sounds. Abdominal:      General: Bowel sounds are normal.      Palpations: Abdomen is soft. Tenderness: There is no abdominal tenderness. Musculoskeletal:      Right lower leg: No edema. Left lower leg: No edema. Lymphadenopathy:      Cervical: No cervical adenopathy. Psychiatric:         Mood and Affect: Mood and affect normal.            Visit Vitals  /77   Pulse 72   Temp 97.1 °F (36.2 °C) (Temporal)   Resp 14   Ht 5' 11.4\" (1.814 m)   Wt 210 lb (95.3 kg)   SpO2 100%   BMI 28.96 kg/m²          Advised him to call back or return to office if symptoms worsen/change/persist.  Discussed expected course/resolution/complications of diagnosis in detail with patient. Medication risks/benefits/costs/interactions/alternatives discussed with patient. He was given an after visit summary which includes diagnoses, current medications, & vitals. He expressed understanding with the diagnosis and plan. Aspects of this note may have been generated using voice recognition software. Despite editing, there may be some syntax errors.        Bogdan Womack MD

## 2020-12-29 NOTE — PATIENT INSTRUCTIONS
Well Visit, Ages 25 to 48: Care Instructions Your Care Instructions Physical exams can help you stay healthy. Your doctor has checked your overall health and may have suggested ways to take good care of yourself. He or she also may have recommended tests. At home, you can help prevent illness with healthy eating, regular exercise, and other steps. Follow-up care is a key part of your treatment and safety. Be sure to make and go to all appointments, and call your doctor if you are having problems. It's also a good idea to know your test results and keep a list of the medicines you take. How can you care for yourself at home? · Reach and stay at a healthy weight. This will lower your risk for many problems, such as obesity, diabetes, heart disease, and high blood pressure. · Get at least 30 minutes of physical activity on most days of the week. Walking is a good choice. You also may want to do other activities, such as running, swimming, cycling, or playing tennis or team sports. Discuss any changes in your exercise program with your doctor. · Do not smoke or allow others to smoke around you. If you need help quitting, talk to your doctor about stop-smoking programs and medicines. These can increase your chances of quitting for good. · Talk to your doctor about whether you have any risk factors for sexually transmitted infections (STIs). Having one sex partner (who does not have STIs and does not have sex with anyone else) is a good way to avoid these infections. · Use birth control if you do not want to have children at this time. Talk with your doctor about the choices available and what might be best for you. · Protect your skin from too much sun. When you're outdoors from 10 a.m. to 4 p.m., stay in the shade or cover up with clothing and a hat with a wide brim. Wear sunglasses that block UV rays. Even when it's cloudy, put broad-spectrum sunscreen (SPF 30 or higher) on any exposed skin. · See a dentist one or two times a year for checkups and to have your teeth cleaned. · Wear a seat belt in the car. Follow your doctor's advice about when to have certain tests. These tests can spot problems early. For everyone · Cholesterol. Have the fat (cholesterol) in your blood tested after age 21. Your doctor will tell you how often to have this done based on your age, family history, or other things that can increase your risk for heart disease. · Blood pressure. Have your blood pressure checked during a routine doctor visit. Your doctor will tell you how often to check your blood pressure based on your age, your blood pressure results, and other factors. · Vision. Talk with your doctor about how often to have a glaucoma test. 
· Diabetes. Ask your doctor whether you should have tests for diabetes. · Colon cancer. Your risk for colorectal cancer gets higher as you get older. Some experts say that adults should start regular screening at age 48 and stop at age 76. Others say to start before age 48 or continue after age 76. Talk with your doctor about your risk and when to start and stop screening. For women · Breast exam and mammogram. Talk to your doctor about when you should have a clinical breast exam and a mammogram. Medical experts differ on whether and how often women under 50 should have these tests. Your doctor can help you decide what is right for you. · Cervical cancer screening test and pelvic exam. Begin with a Pap test at age 24. The test often is part of a pelvic exam. Starting at age 27, you may choose to have a Pap test, an HPV test, or both tests at the same time (called co-testing). Talk with your doctor about how often to have testing. · Tests for sexually transmitted infections (STIs). Ask whether you should have tests for STIs. You may be at risk if you have sex with more than one person, especially if your partners do not wear condoms. For men · Tests for sexually transmitted infections (STIs). Ask whether you should have tests for STIs. You may be at risk if you have sex with more than one person, especially if you do not wear a condom. · Testicular cancer exam. Ask your doctor whether you should check your testicles regularly. · Prostate exam. Talk to your doctor about whether you should have a blood test (called a PSA test) for prostate cancer. Experts differ on whether and when men should have this test. Some experts suggest it if you are older than 39 and are -American or have a father or brother who got prostate cancer when he was younger than 72. When should you call for help? Watch closely for changes in your health, and be sure to contact your doctor if you have any problems or symptoms that concern you. Where can you learn more? Go to http://www.hammond.com/ Enter P072 in the search box to learn more about \"Well Visit, Ages 25 to 48: Care Instructions. \" Current as of: May 27, 2020               Content Version: 12.6 © 2006-2020 [employer]. Care instructions adapted under license by [employer] (which disclaims liability or warranty for this information). If you have questions about a medical condition or this instruction, always ask your healthcare professional. [address] any warranty or liability for your use of this information.

## 2020-12-30 NOTE — PROGRESS NOTES
Results released to patient via [employer]. All labs are stable or at goal for him, except for worsening LDL. Will need to verify he is taking it (reported he was).

## 2021-03-01 DIAGNOSIS — R00.2 PALPITATIONS: ICD-10-CM

## 2021-03-02 RX ORDER — OMEPRAZOLE 40 MG/1
CAPSULE, DELAYED RELEASE ORAL
Qty: 30 CAP | Refills: 1 | OUTPATIENT
Start: 2021-03-02

## 2022-01-22 DIAGNOSIS — E78.00 PURE HYPERCHOLESTEROLEMIA: ICD-10-CM

## 2022-01-23 RX ORDER — SIMVASTATIN 10 MG/1
TABLET, FILM COATED ORAL
Qty: 30 TABLET | Refills: 1 | Status: SHIPPED | OUTPATIENT
Start: 2022-01-23 | End: 2022-04-28

## 2022-01-24 NOTE — TELEPHONE ENCOUNTER
Overdue for f/u and labs. Cancelled/rescheduled last 2 visits.   30 days sent to local.    Future Appointments   Date Time Provider Polly Humphrey   2/23/2022 11:00 AM Xochitl Isaac MD [facility]

## 2022-02-23 ENCOUNTER — OFFICE VISIT (OUTPATIENT)
Dept: INTERNAL MEDICINE CLINIC | Age: 42
End: 2022-02-23
Payer: COMMERCIAL

## 2022-02-23 VITALS
WEIGHT: 211 LBS | SYSTOLIC BLOOD PRESSURE: 132 MMHG | OXYGEN SATURATION: 96 % | RESPIRATION RATE: 16 BRPM | DIASTOLIC BLOOD PRESSURE: 81 MMHG | TEMPERATURE: 98 F | HEIGHT: 71 IN | BODY MASS INDEX: 29.54 KG/M2 | HEART RATE: 80 BPM

## 2022-02-23 DIAGNOSIS — R09.82 POST-NASAL DRIP: ICD-10-CM

## 2022-02-23 DIAGNOSIS — Z00.00 ROUTINE PHYSICAL EXAMINATION: Primary | ICD-10-CM

## 2022-02-23 DIAGNOSIS — E78.00 PURE HYPERCHOLESTEROLEMIA: ICD-10-CM

## 2022-02-23 PROCEDURE — 99396 PREV VISIT EST AGE 40-64: CPT | Performed by: INTERNAL MEDICINE

## 2022-02-23 RX ORDER — ALBUTEROL SULFATE 90 UG/1
2 AEROSOL, METERED RESPIRATORY (INHALATION)
COMMUNITY

## 2022-02-23 RX ORDER — KETOCONAZOLE 20 MG/ML
SHAMPOO TOPICAL
COMMUNITY

## 2022-02-23 NOTE — PATIENT INSTRUCTIONS
Well Visit, Ages 25 to 48: Care Instructions  Overview     Well visits can help you stay healthy. Your doctor has checked your overall health and may have suggested ways to take good care of yourself. Your doctor also may have recommended tests. At home, you can help prevent illness with healthy eating, regular exercise, and other steps. Follow-up care is a key part of your treatment and safety. Be sure to make and go to all appointments, and call your doctor if you are having problems. It's also a good idea to know your test results and keep a list of the medicines you take. How can you care for yourself at home? · Get screening tests that you and your doctor decide on. Screening helps find diseases before any symptoms appear. · Eat healthy foods. Choose fruits, vegetables, whole grains, protein, and low-fat dairy foods. Limit fat, especially saturated fat. Reduce salt in your diet. · Limit alcohol. If you are a man, have no more than 2 drinks a day or 14 drinks a week. If you are a woman, have no more than 1 drink a day or 7 drinks a week. · Get at least 30 minutes of physical activity on most days of the week. Walking is a good choice. You also may want to do other activities, such as running, swimming, cycling, or playing tennis or team sports. Discuss any changes in your exercise program with your doctor. · Reach and stay at a healthy weight. This will lower your risk for many problems, such as obesity, diabetes, heart disease, and high blood pressure. · Do not smoke or allow others to smoke around you. If you need help quitting, talk to your doctor about stop-smoking programs and medicines. These can increase your chances of quitting for good. · Care for your mental health. It is easy to get weighed down by worry and stress. Learn strategies to manage stress, like deep breathing and mindfulness, and stay connected with your family and community.  If you find you often feel sad or hopeless, talk with your doctor. Treatment can help. · Talk to your doctor about whether you have any risk factors for sexually transmitted infections (STIs). You can help prevent STIs if you wait to have sex with a new partner (or partners) until you've each been tested for STIs. It also helps if you use condoms (male or female condoms) and if you limit your sex partners to one person who only has sex with you. Vaccines are available for some STIs, such as HPV. · Use birth control if it's important to you to prevent pregnancy. Talk with your doctor about the choices available and what might be best for you. · If you think you may have a problem with alcohol or drug use, talk to your doctor. This includes prescription medicines (such as amphetamines and opioids) and illegal drugs (such as cocaine and methamphetamine). Your doctor can help you figure out what type of treatment is best for you. · Protect your skin from too much sun. When you're outdoors from 10 a.m. to 4 p.m., stay in the shade or cover up with clothing and a hat with a wide brim. Wear sunglasses that block UV rays. Even when it's cloudy, put broad-spectrum sunscreen (SPF 30 or higher) on any exposed skin. · See a dentist one or two times a year for checkups and to have your teeth cleaned. · Wear a seat belt in the car. When should you call for help? Watch closely for changes in your health, and be sure to contact your doctor if you have any problems or symptoms that concern you. Where can you learn more? Go to http://www.[employer].com/  Enter P072 in the search box to learn more about \"Well Visit, Ages 25 to 48: Care Instructions. \"  Current as of: February 11, 2021               Content Version: 13.0  © 4874-6931 [employer], Incorporated. Care instructions adapted under license by [employer] (which disclaims liability or warranty for this information).  If you have questions about a medical condition or this instruction, always ask your healthcare professional. [place] [address] any warranty or liability for your use of this information.

## 2022-02-23 NOTE — ASSESSMENT & PLAN NOTE
Overall stable, he is off allergy injections and using saline rinses & zyrtec, reviewed options and will defer to what ever improves his quality of life

## 2022-02-23 NOTE — PROGRESS NOTES
Magdi Farah is a 40 y.o. male who was seen in clinic today (2/23/2022) for a physical.  He works for the [employer]. Assessment & Plan:   Below is the assessment and plan developed based on review of pertinent history, physical exam, labs, studies, and medications. 1. Routine physical examination  -     LIPID PANEL; Future  -     HEPATITIS C AB; Future  -     METABOLIC PANEL, COMPREHENSIVE; Future  -     CBC W/O DIFF; Future  2. Pure hypercholesterolemia  Assessment & Plan:  previously well controlled, continue current treatment pending review of labs    3. Post-nasal drip  Assessment & Plan:  Overall stable, he is off allergy injections and using saline rinses & zyrtec, reviewed options and will defer to what ever improves his quality of life     Follow-up and Dispositions    · Return in about 1 year (around 2/23/2023) for FULL PHYSICAL - 15 minutes. Subjective:   Jennifer Burrows is here today for a full physical.      He was last seen on 12/29/20. Since then has cancelled rescheduled twice last month. Since last visit: no changes    Health Maintenance  Immunizations:   Covid: up to date  Influenza: up to date  Tetanus: up to date     Cancer screening:    Reviewed testicular, prostate, and colon cancer screening guidelines. The following sections were reviewed & updated as appropriate: Problem List, Allergies, PMH, PSH, FH, and SH. Prior to Admission medications    Medication Sig Start Date End Date Taking? Authorizing Provider   ketoconazole (NIZORAL) 2 % shampoo ketoconazole 2 % shampoo   Yes Provider, Historical   albuterol (ProAir HFA) 90 mcg/actuation inhaler Take 2 Puffs by inhalation daily as needed. Yes Provider, Historical   simvastatin (ZOCOR) 10 mg tablet TAKE 1 TABLET BY MOUTH AT BEDTIME 1/23/22  Yes Pat Harper MD   melatonin 10 mg capsule Take 10 mg by mouth nightly as needed.    Yes Provider, Historical   cetirizine (ZYRTEC) 10 mg tablet TAKE 1 TABLET BY MOUTH AT BEDTIME 4/9/18  Yes Provider, Historical   triamcinolone (NASACORT AQ) 55 mcg nasal inhaler 2 Sprays daily as needed. Yes Provider, Historical   OTHER Allergy shots every week  Patient not taking: Reported on 2/23/2022    Provider, Historical   OTHER Ketoconazole shampoo  2/23/22  Provider, Historical          Review of Systems   Constitutional: Negative for chills and fever. Respiratory: Negative for cough and shortness of breath. Cardiovascular: Negative for chest pain and palpitations. Gastrointestinal: Negative for abdominal pain, blood in stool, constipation, diarrhea, heartburn, nausea and vomiting. Musculoskeletal: Negative for joint pain and myalgias. Neurological: Negative for tingling, focal weakness and headaches. Endo/Heme/Allergies: Does not bruise/bleed easily. Psychiatric/Behavioral: Negative for depression. The patient is not nervous/anxious and does not have insomnia. Subjective:   Physical Exam  Constitutional:       General: He is not in acute distress. Appearance: He is well-developed. HENT:      Right Ear: Tympanic membrane and ear canal normal.      Left Ear: Tympanic membrane and ear canal normal.   Eyes:      Conjunctiva/sclera: Conjunctivae normal.   Neck:      Thyroid: No thyromegaly. Cardiovascular:      Rate and Rhythm: Regular rhythm. Heart sounds: Normal heart sounds. No murmur heard. Pulmonary:      Effort: Pulmonary effort is normal.      Breath sounds: Normal breath sounds. Abdominal:      General: Bowel sounds are normal.      Palpations: Abdomen is soft. Tenderness: There is no abdominal tenderness. Musculoskeletal:      Right lower leg: No edema. Left lower leg: No edema. Lymphadenopathy:      Cervical: No cervical adenopathy.    Psychiatric:         Mood and Affect: Mood and affect normal.            Visit Vitals  /81 (BP 1 Location: Right arm, BP Patient Position: Sitting, BP Cuff Size: Large adult) Pulse 80   Temp 98 °F (36.7 °C) (Temporal)   Resp 16   Ht 5' 11.26\" (1.81 m)   Wt 211 lb (95.7 kg)   SpO2 96%   BMI 29.21 kg/m²          Kathrine Schaefer MD

## 2022-04-28 DIAGNOSIS — E78.00 PURE HYPERCHOLESTEROLEMIA: ICD-10-CM

## 2022-04-28 LAB
ALBUMIN SERPL-MCNC: 4.8 G/DL (ref 4–5)
ALBUMIN/GLOB SERPL: 1.5 {RATIO} (ref 1.2–2.2)
ALP SERPL-CCNC: 58 IU/L (ref 44–121)
ALT SERPL-CCNC: 23 IU/L (ref 0–44)
AST SERPL-CCNC: 14 IU/L (ref 0–40)
BILIRUB SERPL-MCNC: 0.4 MG/DL (ref 0–1.2)
BUN SERPL-MCNC: 18 MG/DL (ref 6–24)
BUN/CREAT SERPL: 17 (ref 9–20)
CALCIUM SERPL-MCNC: 9.8 MG/DL (ref 8.7–10.2)
CHLORIDE SERPL-SCNC: 99 MMOL/L (ref 96–106)
CHOLEST SERPL-MCNC: 232 MG/DL (ref 100–199)
CO2 SERPL-SCNC: 23 MMOL/L (ref 20–29)
CREAT SERPL-MCNC: 1.06 MG/DL (ref 0.76–1.27)
EGFR: 90 ML/MIN/1.73
ERYTHROCYTE [DISTWIDTH] IN BLOOD BY AUTOMATED COUNT: 13.6 % (ref 11.6–15.4)
GLOBULIN SER CALC-MCNC: 3.2 G/DL (ref 1.5–4.5)
GLUCOSE SERPL-MCNC: 89 MG/DL (ref 65–99)
HCT VFR BLD AUTO: 43 % (ref 37.5–51)
HCV AB S/CO SERPL IA: <0.1 S/CO RATIO (ref 0–0.9)
HDLC SERPL-MCNC: 45 MG/DL
HGB BLD-MCNC: 14.3 G/DL (ref 13–17.7)
LDLC SERPL CALC-MCNC: 171 MG/DL (ref 0–99)
MCH RBC QN AUTO: 27.7 PG (ref 26.6–33)
MCHC RBC AUTO-ENTMCNC: 33.3 G/DL (ref 31.5–35.7)
MCV RBC AUTO: 83 FL (ref 79–97)
PLATELET # BLD AUTO: 227 X10E3/UL (ref 150–450)
POTASSIUM SERPL-SCNC: 4.5 MMOL/L (ref 3.5–5.2)
PROT SERPL-MCNC: 8 G/DL (ref 6–8.5)
RBC # BLD AUTO: 5.16 X10E6/UL (ref 4.14–5.8)
SODIUM SERPL-SCNC: 138 MMOL/L (ref 134–144)
TRIGL SERPL-MCNC: 90 MG/DL (ref 0–149)
VLDLC SERPL CALC-MCNC: 16 MG/DL (ref 5–40)
WBC # BLD AUTO: 5.6 X10E3/UL (ref 3.4–10.8)

## 2022-04-28 RX ORDER — SIMVASTATIN 20 MG/1
20 TABLET, FILM COATED ORAL
Qty: 90 TABLET | Refills: 0 | Status: SHIPPED | OUTPATIENT
Start: 2022-04-28 | End: 2022-08-01

## 2022-04-28 NOTE — PROGRESS NOTES
Results released to patient via [employer]. All labs are stable or at goal for him, except for lipids. Above goal.  Will need to verify he is taking and if he is will need to increase dose.

## 2022-06-30 ENCOUNTER — TELEPHONE (OUTPATIENT)
Dept: INTERNAL MEDICINE CLINIC | Age: 42
End: 2022-06-30

## 2022-06-30 NOTE — TELEPHONE ENCOUNTER
Wife tested positive yesterday, Pt. Tested positive today and feels fatigued, T101, instructed to Tx. Symptoms as needed and to call their pediatrician have 2 kids at summer camp that are negative at the moment. Encouraged to do an E-visit tomorrow with Dr Milka Charles. Pt. Is concerned because he's obese.

## 2022-06-30 NOTE — TELEPHONE ENCOUNTER
Reason for call: Pt calling--he is overweight and wondering if this makes him a high risk COVID+ patient.      Is this a new problem: yes     Date of last appointment:  2/23/2022     Can we respond via [facility]MI: no    Best call back number: [phone]

## 2022-07-01 NOTE — TELEPHONE ENCOUNTER
He does not qualify to anti-viral.  Obesity does put him a slightly higher risk class but not significantly. Based on age symptoms hopefully will be mild. Current variant is more contagious but not as deadly as delta variant. Rest.  Hydrate. Treat w/ OTC cold medications. Isolate.

## 2022-07-01 NOTE — TELEPHONE ENCOUNTER
Pt. Given Dr Seema Boyd message and states he's feeling better today and will let us know if they need anything.

## 2022-08-01 DIAGNOSIS — E78.00 PURE HYPERCHOLESTEROLEMIA: ICD-10-CM

## 2022-08-01 RX ORDER — SIMVASTATIN 20 MG/1
TABLET, FILM COATED ORAL
Qty: 90 TABLET | Refills: 1 | Status: SHIPPED | OUTPATIENT
Start: 2022-08-01

## 2022-08-02 NOTE — TELEPHONE ENCOUNTER
Future Appointments   Date Time Provider Polly Humphrey   2/24/2023 11:00 AM Manuelito Sheridan MD [facility]

## 2023-02-19 DIAGNOSIS — E78.00 PURE HYPERCHOLESTEROLEMIA: ICD-10-CM

## 2023-02-19 RX ORDER — SIMVASTATIN 20 MG/1
TABLET, FILM COATED ORAL
Qty: 90 TABLET | Refills: 0 | Status: SHIPPED | OUTPATIENT
Start: 2023-02-19

## 2023-02-20 NOTE — TELEPHONE ENCOUNTER
Future Appointments   Date Time Provider Polly Humphrey   2/24/2023 11:00 AM Nickie Ge MD [facility]

## 2023-05-24 RX ORDER — SIMVASTATIN 20 MG
1 TABLET ORAL NIGHTLY
COMMUNITY
Start: 2023-02-19 | End: 2023-07-04

## 2023-05-24 RX ORDER — MELATONIN 10 MG
10 CAPSULE ORAL
COMMUNITY

## 2023-05-24 RX ORDER — ALBUTEROL SULFATE 90 UG/1
2 AEROSOL, METERED RESPIRATORY (INHALATION) DAILY PRN
COMMUNITY

## 2023-05-24 RX ORDER — CETIRIZINE HYDROCHLORIDE 10 MG/1
1 TABLET ORAL NIGHTLY
COMMUNITY
Start: 2018-04-09

## 2023-05-24 RX ORDER — TRIAMCINOLONE ACETONIDE 55 UG/1
2 SPRAY, METERED NASAL DAILY PRN
COMMUNITY

## 2023-05-24 RX ORDER — KETOCONAZOLE 20 MG/ML
SHAMPOO TOPICAL
COMMUNITY

## 2023-07-04 RX ORDER — SIMVASTATIN 20 MG
TABLET ORAL
Qty: 30 TABLET | Refills: 0 | Status: SHIPPED | OUTPATIENT
Start: 2023-07-04

## 2023-07-05 NOTE — TELEPHONE ENCOUNTER
TC to pt. Pt states he has a new PCP and will no longer be seeing Dr[facility] Villalba [facility]

## 2025-02-07 ENCOUNTER — OFFICE VISIT (OUTPATIENT)
Age: 45
End: 2025-02-07

## 2025-02-07 VITALS
OXYGEN SATURATION: 97 % | BODY MASS INDEX: 31.22 KG/M2 | TEMPERATURE: 98.5 F | DIASTOLIC BLOOD PRESSURE: 75 MMHG | SYSTOLIC BLOOD PRESSURE: 122 MMHG | HEART RATE: 105 BPM | WEIGHT: 223 LBS | HEIGHT: 71 IN | RESPIRATION RATE: 18 BRPM

## 2025-02-07 DIAGNOSIS — U07.1 COVID-19: Primary | ICD-10-CM

## 2025-02-07 DIAGNOSIS — J06.9 UPPER RESPIRATORY TRACT INFECTION, UNSPECIFIED TYPE: ICD-10-CM

## 2025-02-07 LAB
EXP DATE SOLUTION: ABNORMAL
EXP DATE SWAB: ABNORMAL
EXPIRATION DATE: ABNORMAL
LOT NUMBER POC: ABNORMAL
LOT NUMBER SOLUTION: ABNORMAL
LOT NUMBER SWAB: ABNORMAL
SARS-COV-2 RNA, POC: POSITIVE

## 2025-02-07 NOTE — PATIENT INSTRUCTIONS
Thank you for visiting [facility] today    Covid-19 -  Your Covid test was positive in clinic today  Paxlovid, twice daily for 5 days  Recommend drink plenty fluids  Hold your simvastatin while taking this medication  Please see below for symptomatic treatment recommendations:    Nasal Congestion:  Steroid nasal spray, such as Flonase, Nasonex, or Nasacort  Normal saline nasal spray  Afrin nasal spray no longer than three days  Oral decongestants, such as Sudafed/pseudoephedrine  Do not take if you have a history of high blood pressure, take Coricidin HBP (or the generic form) instead. Follow instructions on the box  Cough:  Throat lozenges, hot tea, and honey may help  Vicks VapoRub at night to help with cough and relieve muscles aches and pain  If not prescribed a cough medication, Delsym is an option. It is an over the counter cough medication containing dextromethorphan to help suppress cough at night   *Please only take when absolutely needed, as this is a controlled substance that can cause addiction   *Please only take cough syrup at nighttime as it causes drowsiness   *Do not drive or operate any machinery while taking this medication  Chest Congestion:  For thick mucus, take an expectorant (guaifenesin only) to help thin the mucus. Follow instructions on the box. You will need to drink plenty of water with this medication  Sore Throat:  Lozenges, as needed. Cepacol lozenges will help numb the throat  Chloraseptic spray also helps to numb throat pain  Salt water gargles (1/4 tsp salt in 8 oz of water) to soothe throat pain  Sinus pain/pressure:  Warm, wet towel on face to help with facial sinus pain/pressure  Headache/Pain Fever/Body Aches:  If you can take NSAIDs, take Ibuprofen 400-600 mg every 8 hours as needed  If you cannot take NSAIDs, take Tylenol 325-500 mg every 6 hours as needed  Miscellanous:  Antihistamines: Zyrtec/Allegra/Claritin during the day or Benadryl at night may help with

## 2025-02-07 NOTE — PROGRESS NOTES
Kirt Rincon (:  1980) is a 44 y.o. male,New patient, here for evaluation of the following chief complaint(s):  Generalized Body Aches (C/O mild congestion, body aches, chills. Pt taking day quill onset of sx started yesterday.Pt took a home test for covid today before coming in and it was positive.)        SUBJECTIVE/OBJECTIVE:    History provided by:  Patient       44 y.o. male presents with symptoms of nasal congestion, body aches, chills, sore throat, little bit of cough.  All started yesterday.  Took a Covid test at home about an hour ago and was positive and came straight here.    He has had Covid in the past and took Paxlovid and tolerated it well.  States he was prescribed it because he is overweight.  Last time having Covid was over a year ago.  He had received a Covid vaccination when Covid first came out, but has not had any boosters.    He denies any history of lung disease or cardiac disease with exception of hyperlipidemia.  He denies any history of renal disease.        Vitals:    25 1132   BP: 122/75   Site: Right Upper Arm   Position: Sitting   Cuff Size: Large Adult   Pulse: (!) 105   Resp: 18   Temp: 98.5 °F (36.9 °C)   TempSrc: Oral   SpO2: 97%   Weight: 101.2 kg (223 lb)   Height: 1.803 m (5' 11\")       Results for orders placed or performed in visit on 25   AMB POC COVID-19 COV   Result Value Ref Range    SARS-COV-2 RNA, POC Positive     Lot number swab [id number]     EXP date swab      Lot number solution      EXP date solution      LOT NUMBER POC      EXPIRATION DATE          Physical Exam  Constitutional:       General: He is not in acute distress.     Appearance: Normal appearance. He is ill-appearing (tired). He is not toxic-appearing.   HENT:      Head: Normocephalic and atraumatic.      Right Ear: Tympanic membrane, ear canal and external ear normal.      Left Ear: Tympanic membrane, ear canal and external ear normal.      Nose: Congestion present.